# Patient Record
Sex: FEMALE | Race: OTHER | NOT HISPANIC OR LATINO
[De-identification: names, ages, dates, MRNs, and addresses within clinical notes are randomized per-mention and may not be internally consistent; named-entity substitution may affect disease eponyms.]

---

## 2018-10-20 ENCOUNTER — TRANSCRIPTION ENCOUNTER (OUTPATIENT)
Age: 48
End: 2018-10-20

## 2019-01-31 PROBLEM — Z00.00 ENCOUNTER FOR PREVENTIVE HEALTH EXAMINATION: Status: ACTIVE | Noted: 2019-01-31

## 2019-02-01 ENCOUNTER — APPOINTMENT (OUTPATIENT)
Dept: DERMATOLOGY | Facility: CLINIC | Age: 49
End: 2019-02-01
Payer: COMMERCIAL

## 2019-02-01 VITALS
BODY MASS INDEX: 34.32 KG/M2 | SYSTOLIC BLOOD PRESSURE: 120 MMHG | HEIGHT: 65 IN | DIASTOLIC BLOOD PRESSURE: 70 MMHG | WEIGHT: 206 LBS

## 2019-02-01 DIAGNOSIS — Z84.0 FAMILY HISTORY OF DISEASES OF THE SKIN AND SUBCUTANEOUS TISSUE: ICD-10-CM

## 2019-02-01 DIAGNOSIS — Z80.8 FAMILY HISTORY OF MALIGNANT NEOPLASM OF OTHER ORGANS OR SYSTEMS: ICD-10-CM

## 2019-02-01 DIAGNOSIS — Z91.89 OTHER SPECIFIED PERSONAL RISK FACTORS, NOT ELSEWHERE CLASSIFIED: ICD-10-CM

## 2019-02-01 DIAGNOSIS — Z86.69 PERSONAL HISTORY OF OTHER DISEASES OF THE NERVOUS SYSTEM AND SENSE ORGANS: ICD-10-CM

## 2019-02-01 PROCEDURE — 99203 OFFICE O/P NEW LOW 30 MIN: CPT

## 2019-02-01 NOTE — PHYSICAL EXAM
[Alert] : alert [Oriented x 3] : ~L oriented x 3 [Well Nourished] : well nourished [Conjunctiva Non-injected] : conjunctiva non-injected [No Visual Lymphadenopathy] : no visual  lymphadenopathy [No Clubbing] : no clubbing [No Edema] : no edema [No Bromhidrosis] : no bromhidrosis [No Chromhidrosis] : no chromhidrosis [FreeTextEntry3] : erythematous pink papules around mouth\par mobile subcutaneous nodule on L parietal scalp

## 2019-02-01 NOTE — HISTORY OF PRESENT ILLNESS
[FreeTextEntry1] : rash around mouth [de-identified] : 48F here for eval of:\par \par 1) Rash around mouth x 1m, getting worse. Itchy. Not previously treated.\par 2) Bump on head x many years. Asymptomatic. No treatment attempted. Not changing.\par

## 2019-02-01 NOTE — REVIEW OF SYSTEMS
[Patient Intake Form Reviewed] : Patient intake form was reviewed [Night Sweats] : night sweats [Joint Pain] : joint pain [Negative] : Genitourinary

## 2019-03-15 ENCOUNTER — APPOINTMENT (OUTPATIENT)
Dept: DERMATOLOGY | Facility: CLINIC | Age: 49
End: 2019-03-15

## 2019-08-19 ENCOUNTER — TRANSCRIPTION ENCOUNTER (OUTPATIENT)
Age: 49
End: 2019-08-19

## 2019-08-24 ENCOUNTER — TRANSCRIPTION ENCOUNTER (OUTPATIENT)
Age: 49
End: 2019-08-24

## 2019-10-12 ENCOUNTER — TRANSCRIPTION ENCOUNTER (OUTPATIENT)
Age: 49
End: 2019-10-12

## 2020-04-26 ENCOUNTER — MESSAGE (OUTPATIENT)
Age: 50
End: 2020-04-26

## 2020-05-13 ENCOUNTER — APPOINTMENT (OUTPATIENT)
Dept: DISASTER EMERGENCY | Facility: CLINIC | Age: 50
End: 2020-05-13

## 2020-05-14 LAB
SARS-COV-2 IGG SERPL IA-ACNC: 4.9 AU/ML
SARS-COV-2 IGG SERPL QL IA: NEGATIVE

## 2020-06-02 ENCOUNTER — APPOINTMENT (OUTPATIENT)
Dept: CHRONIC DISEASE MANAGEMENT | Facility: CLINIC | Age: 50
End: 2020-06-02

## 2020-06-02 VITALS — WEIGHT: 255 LBS | BODY MASS INDEX: 42.49 KG/M2 | HEIGHT: 65 IN

## 2020-06-17 ENCOUNTER — APPOINTMENT (OUTPATIENT)
Dept: CHRONIC DISEASE MANAGEMENT | Facility: CLINIC | Age: 50
End: 2020-06-17

## 2020-06-17 VITALS — WEIGHT: 255 LBS | BODY MASS INDEX: 42.43 KG/M2

## 2020-07-01 ENCOUNTER — APPOINTMENT (OUTPATIENT)
Dept: CHRONIC DISEASE MANAGEMENT | Facility: CLINIC | Age: 50
End: 2020-07-01

## 2020-07-01 VITALS — BODY MASS INDEX: 42.43 KG/M2 | WEIGHT: 255 LBS

## 2020-07-15 ENCOUNTER — APPOINTMENT (OUTPATIENT)
Dept: CHRONIC DISEASE MANAGEMENT | Facility: CLINIC | Age: 50
End: 2020-07-15

## 2020-07-15 VITALS — BODY MASS INDEX: 42.27 KG/M2 | WEIGHT: 254 LBS

## 2020-07-16 ENCOUNTER — TRANSCRIPTION ENCOUNTER (OUTPATIENT)
Age: 50
End: 2020-07-16

## 2020-08-26 ENCOUNTER — APPOINTMENT (OUTPATIENT)
Dept: ENDOCRINOLOGY | Facility: CLINIC | Age: 50
End: 2020-08-26
Payer: COMMERCIAL

## 2020-08-26 VITALS
BODY MASS INDEX: 41.99 KG/M2 | WEIGHT: 252 LBS | HEIGHT: 65 IN | SYSTOLIC BLOOD PRESSURE: 124 MMHG | TEMPERATURE: 98 F | HEART RATE: 79 BPM | DIASTOLIC BLOOD PRESSURE: 78 MMHG | OXYGEN SATURATION: 98 %

## 2020-08-26 PROCEDURE — 99205 OFFICE O/P NEW HI 60 MIN: CPT | Mod: 25

## 2020-08-26 PROCEDURE — 76536 US EXAM OF HEAD AND NECK: CPT

## 2020-08-26 NOTE — PROCEDURE
[Ubicom e 2008 model, 10-12 MHz frequencies] : multiple real time longitudinal and transverse images were obtained using a high resolution ultrasound with a linear transducer, Ubicom e 2008 model, 10-12 MHz frequencies. All measurements will be reported as longitudinal x syd-posterior x transverse. [Thyroid Nodule] : thyroid nodule [Right Thyroid] : right [] : a homogeneous parenchyma [Mixed] : mixed [Regular] : regular [No] : does not have a halo [No calcification] : no calcification [Left Thyroid] : left [Mid] : mid pole there is a  [Solid] : solid [Ovoid] : ovoid in shape [Spongiform Appearance] : spongiform appearance [Smooth] : smooth [No calcifications] : no calcifications [2] : 2 [Peripheral vascularity] : peripheral vascularity [No abnormal lymph nodes are seen.] : no abnormal lymph nodes are seen [FreeTextEntry1] : 3.93 x  1.12 x 1.64 [FreeTextEntry5] : 3.86 x 0.84 x 1.2 [FreeTextEntry2] : 0.18 [FreeTextEntry3] : 0.72 x 0.43 x 0.55

## 2020-08-26 NOTE — REVIEW OF SYSTEMS
[Fatigue] : no fatigue [Decreased Appetite] : appetite not decreased [Recent Weight Gain (___ Lbs)] : no recent weight gain [Visual Field Defect] : no visual field defect [Recent Weight Loss (___ Lbs)] : no recent weight loss [Dysphagia] : no dysphagia [Dry Eyes] : no dryness [Dysphonia] : no dysphonia [Neck Pain] : no neck pain [Nasal Congestion] : no nasal congestion [Chest Pain] : no chest pain [Slow Heart Rate] : heart rate is not slow [Palpitations] : no palpitations [Shortness Of Breath] : no shortness of breath [Fast Heart Rate] : heart rate is not fast [Nausea] : no nausea [Cough] : no cough [Constipation] : no constipation [Vomiting] : no vomiting [Diarrhea] : no diarrhea [Polyuria] : no polyuria [Irregular Menses] : regular menses [Joint Pain] : no joint pain [Muscle Weakness] : no muscle weakness [Dizziness] : no dizziness [Headaches] : no headaches [Polydipsia] : no polydipsia [Tremors] : no tremors [Cold Intolerance] : no cold intolerance [Easy Bleeding] : no ~M tendency for easy bleeding [Easy Bruising] : no tendency for easy bruising

## 2020-08-26 NOTE — PROCEDURE
[E-Duction e 2008 model, 10-12 MHz frequencies] : multiple real time longitudinal and transverse images were obtained using a high resolution ultrasound with a linear transducer, E-Duction e 2008 model, 10-12 MHz frequencies. All measurements will be reported as longitudinal x syd-posterior x transverse. [Thyroid Nodule] : thyroid nodule [Right Thyroid] : right [] : a homogeneous parenchyma [Mixed] : mixed [No] : does not have a halo [Regular] : regular [No calcification] : no calcification [Left Thyroid] : left [Solid] : solid [Mid] : mid pole there is a  [Smooth] : smooth [Ovoid] : ovoid in shape [Spongiform Appearance] : spongiform appearance [No calcifications] : no calcifications [Peripheral vascularity] : peripheral vascularity [2] : 2 [No abnormal lymph nodes are seen.] : no abnormal lymph nodes are seen [FreeTextEntry1] : 3.93 x  1.12 x 1.64 [FreeTextEntry5] : 3.86 x 0.84 x 1.2 [FreeTextEntry2] : 0.18 [FreeTextEntry3] : 0.72 x 0.43 x 0.55

## 2020-08-26 NOTE — REVIEW OF SYSTEMS
[Fatigue] : no fatigue [Decreased Appetite] : appetite not decreased [Recent Weight Gain (___ Lbs)] : no recent weight gain [Visual Field Defect] : no visual field defect [Recent Weight Loss (___ Lbs)] : no recent weight loss [Dysphagia] : no dysphagia [Dry Eyes] : no dryness [Dysphonia] : no dysphonia [Neck Pain] : no neck pain [Nasal Congestion] : no nasal congestion [Chest Pain] : no chest pain [Slow Heart Rate] : heart rate is not slow [Palpitations] : no palpitations [Shortness Of Breath] : no shortness of breath [Fast Heart Rate] : heart rate is not fast [Cough] : no cough [Nausea] : no nausea [Diarrhea] : no diarrhea [Vomiting] : no vomiting [Constipation] : no constipation [Polyuria] : no polyuria [Irregular Menses] : regular menses [Joint Pain] : no joint pain [Muscle Weakness] : no muscle weakness [Headaches] : no headaches [Dizziness] : no dizziness [Tremors] : no tremors [Polydipsia] : no polydipsia [Easy Bleeding] : no ~M tendency for easy bleeding [Cold Intolerance] : no cold intolerance [Easy Bruising] : no tendency for easy bruising

## 2020-08-26 NOTE — PHYSICAL EXAM
[Alert] : alert [Well Nourished] : well nourished [No Acute Distress] : no acute distress [EOMI] : extra ocular movement intact [Normal Sclera/Conjunctiva] : normal sclera/conjunctiva [PERRL] : pupils equal, round and reactive to light [Normal Outer Ear/Nose] : the ears and nose were normal in appearance [Normal Hearing] : hearing was normal [Normal TMs] : both tympanic membranes were normal [Thyroid Not Enlarged] : the thyroid was not enlarged [Clear to Auscultation] : lungs were clear to auscultation bilaterally [No Respiratory Distress] : no respiratory distress [Normal Rate] : heart rate was normal [Normal S1, S2] : normal S1 and S2 [Not Tender] : non-tender [Normal Bowel Sounds] : normal bowel sounds [Regular Rhythm] : with a regular rhythm [Soft] : abdomen soft [Normal Gait] : normal gait [No Clubbing, Cyanosis] : no clubbing  or cyanosis of the fingernails [No Joint Swelling] : no joint swelling seen [No Rash] : no rash [No Skin Lesions] : no skin lesions [Normal Reflexes] : deep tendon reflexes were 2+ and symmetric [No Motor Deficits] : the motor exam was normal [Oriented x3] : oriented to person, place, and time [No Tremors] : no tremors [Normal Affect] : the affect was normal [Normal Insight/Judgement] : insight and judgment were intact [Normal Mood] : the mood was normal

## 2020-08-26 NOTE — ASSESSMENT
[FreeTextEntry1] : 49 year old female here for evaluation of hypothyroidism. \par Clinically hypothyroid and found to have a TSH of 7.8 \par Also noted on thyroid ultrasound today to have a right and left sided thyroid nodule ( Right sided nodule was 1.79 cm and mixed) and left sided was spongiform nodule at 0.88 cm \par \par \par -Start Levothyroxine 75 mcg daily \par -Check TFTs in 4-6 weeks \par -Weight loss techniques were discussed \par -Will schedule for right sided nodule FNA meeting FABIO critieria for biopsy \par \par -Follow up in 4 weeks for FNA

## 2020-08-26 NOTE — HISTORY OF PRESENT ILLNESS
[FreeTextEntry1] : 49 year old female here for evaluation of hypothyroidism \par \par \par She reported that she had a general unwell feeling. \par She reported excessive fatigue and joint pains \par Reported that she has an uncomfortable sensation in the anterior neck pain \par \par \par Symptoms: \par +Constipation \par +Periods are becoming irregular from April 2020 \par No cold intolerance \par Increased hair loss \par Lost edge of the eyebrows ( couple of years) \par Reported that she has gained 50 lbs in the last year  \par \par \par 08/14/2020:\par TSH of 7.82 and Free T4; 0.79

## 2020-08-26 NOTE — PHYSICAL EXAM
[No Acute Distress] : no acute distress [Alert] : alert [Well Nourished] : well nourished [EOMI] : extra ocular movement intact [Normal Sclera/Conjunctiva] : normal sclera/conjunctiva [PERRL] : pupils equal, round and reactive to light [Normal Outer Ear/Nose] : the ears and nose were normal in appearance [Normal TMs] : both tympanic membranes were normal [Normal Hearing] : hearing was normal [Thyroid Not Enlarged] : the thyroid was not enlarged [Clear to Auscultation] : lungs were clear to auscultation bilaterally [No Respiratory Distress] : no respiratory distress [Normal Rate] : heart rate was normal [Normal S1, S2] : normal S1 and S2 [Regular Rhythm] : with a regular rhythm [Normal Bowel Sounds] : normal bowel sounds [Not Tender] : non-tender [Soft] : abdomen soft [No Clubbing, Cyanosis] : no clubbing  or cyanosis of the fingernails [Normal Gait] : normal gait [No Joint Swelling] : no joint swelling seen [No Rash] : no rash [No Motor Deficits] : the motor exam was normal [Normal Reflexes] : deep tendon reflexes were 2+ and symmetric [No Skin Lesions] : no skin lesions [Oriented x3] : oriented to person, place, and time [No Tremors] : no tremors [Normal Affect] : the affect was normal [Normal Insight/Judgement] : insight and judgment were intact [Normal Mood] : the mood was normal

## 2020-08-26 NOTE — IMPRESSION
[FreeTextEntry1] : Right sided 1.79 cm mixed nodule meets FNA criteria  [FreeTextEntry2] : Schedule for next available FNA

## 2020-09-24 ENCOUNTER — APPOINTMENT (OUTPATIENT)
Dept: ENDOCRINOLOGY | Facility: CLINIC | Age: 50
End: 2020-09-24
Payer: COMMERCIAL

## 2020-09-24 VITALS
TEMPERATURE: 97.8 F | SYSTOLIC BLOOD PRESSURE: 110 MMHG | HEIGHT: 65 IN | BODY MASS INDEX: 43.15 KG/M2 | DIASTOLIC BLOOD PRESSURE: 80 MMHG | WEIGHT: 259 LBS

## 2020-09-24 PROCEDURE — 10005 FNA BX W/US GDN 1ST LES: CPT

## 2020-09-24 NOTE — PROCEDURE
[Fine Needle Aspiration] : Fine needle aspiration ~T ~C was performed. [Area of Mass: ______] : mass identified in the [unfilled] [Risks] : risks [Patient] : the patient [Benefits] : benefits [Lidocaine Cream] : lidocaine cream [Supine] : The patient was placed in the supine position with the neck extended as tolerated. [Alcohol] : with alcohol [25 gauge 1.5 inch] : A 25 gauge 1.5 inch needle was used [Ultrasonic Guidance] : ultrasound guidance was employed [Sent to Histology] : The specimens were prepared in the usual manner and sent to histology. [Afirma] : Afirma [Tolerated Well] : the patient tolerated the procedure well [Vital Signs Stable] : the vital signs were stable [FreeTextEntry1] : Right sided thyroid nodule (Right sided nodule was 1.79 cm and mixed) s/p FNA \par \par -F/u on results and patient will be called back\par -Saved for affirma \par

## 2020-09-25 LAB
FNA, THYROID: NORMAL
T4 FREE SERPL-MCNC: 1.2 NG/DL
THYROGLOB AB SERPL-ACNC: 42.7 IU/ML
THYROPEROXIDASE AB SERPL IA-ACNC: 10.1 IU/ML
TSH SERPL-ACNC: 2.55 UIU/ML

## 2020-10-20 ENCOUNTER — APPOINTMENT (OUTPATIENT)
Dept: PLASTIC SURGERY | Facility: CLINIC | Age: 50
End: 2020-10-20
Payer: COMMERCIAL

## 2020-10-20 VITALS
SYSTOLIC BLOOD PRESSURE: 112 MMHG | WEIGHT: 259 LBS | HEIGHT: 65 IN | BODY MASS INDEX: 43.15 KG/M2 | DIASTOLIC BLOOD PRESSURE: 78 MMHG

## 2020-10-20 DIAGNOSIS — Z78.9 OTHER SPECIFIED HEALTH STATUS: ICD-10-CM

## 2020-10-20 DIAGNOSIS — Z87.891 PERSONAL HISTORY OF NICOTINE DEPENDENCE: ICD-10-CM

## 2020-10-20 PROCEDURE — 99205 OFFICE O/P NEW HI 60 MIN: CPT

## 2020-10-20 PROCEDURE — 99072 ADDL SUPL MATRL&STAF TM PHE: CPT

## 2020-10-20 NOTE — CONSULT LETTER
[Dear  ___] : Dear ~SAMMY, [Referral Letter:] : I am referring [unfilled] to you for further evaluation.  My most recent evaluation follows. [Referral Closing:] : Thank you very much for seeing this patient.  If you have any questions, please do not hesitate to contact me. [Sincerely,] : Sincerely, [FreeTextEntry3] : Jason Noe MD, FACS\par Professor and System Vice-Chair, Department of Surgery, [facility]\par Director of Oncologic Reconstruction, [facility]\par , [facility]\par \par [facility]\par [address] [place] [address]\par [place] [address]\par Phone: [phone]\par Fax: [phone]\par Email: [email or web address]\par  [FreeTextEntry2] : Gabriela Maya MD

## 2020-10-20 NOTE — PHYSICAL EXAM
[Bra Size: _______] : Bra Size: [unfilled] [de-identified] : bilateral large breast and grade 2-3 ptosis. right breast with ecchymosis from recent biopsy [de-identified] : large pannus adequate for bilateral reconstruction

## 2020-10-22 ENCOUNTER — APPOINTMENT (OUTPATIENT)
Dept: BREAST CENTER | Facility: CLINIC | Age: 50
End: 2020-10-22
Payer: COMMERCIAL

## 2020-10-22 VITALS
HEART RATE: 80 BPM | HEIGHT: 65 IN | SYSTOLIC BLOOD PRESSURE: 114 MMHG | DIASTOLIC BLOOD PRESSURE: 76 MMHG | WEIGHT: 262 LBS | BODY MASS INDEX: 43.65 KG/M2

## 2020-10-22 DIAGNOSIS — Z80.8 FAMILY HISTORY OF MALIGNANT NEOPLASM OF OTHER ORGANS OR SYSTEMS: ICD-10-CM

## 2020-10-22 DIAGNOSIS — Z80.0 FAMILY HISTORY OF MALIGNANT NEOPLASM OF DIGESTIVE ORGANS: ICD-10-CM

## 2020-10-22 PROCEDURE — 99205 OFFICE O/P NEW HI 60 MIN: CPT

## 2020-10-22 RX ORDER — METRONIDAZOLE 7.5 MG/G
0.75 CREAM TOPICAL TWICE DAILY
Qty: 1 | Refills: 2 | Status: DISCONTINUED | COMMUNITY
Start: 2019-02-01 | End: 2020-10-22

## 2020-10-22 NOTE — HISTORY OF PRESENT ILLNESS
[FreeTextEntry1] : This is a 49 year old  female who was found to have an abnormality on her routine right mammogram and ultrasound .  An ultrasound guided core biopsy shows a moderately differentiated invasive ductal carcinoma, ER +VA+Her 2 neg tumor.\par \par She has had previous needle biopsies and cyst aspirations of her breasts.  \par \par She already met with Dr. Noe to discuss reconstructive surgery as she has been considering bilateral mastectomies with  EJ reconstruction.

## 2020-10-22 NOTE — DATA REVIEWED
[FreeTextEntry1] : Bilateral mammogram 10/7/2020:  Right UOQ anterior distortion.\par \par Bilateral breast ultrasound 10/7/2020:  Right 11:00 N1 irregular hypoechoic nodular tissue with increased vascularity.  Bilateral stable hypoechoic nodules.\par \par Right breast US guided core biopsy 10/14/2020 11:00- venus clip\par Pathology= mod differentiated invasive ductal carcinoma SBR 6/9 1.1 cm, ki 67 15-20%  ER >90% AK>90% Her 2 neg\par \par (Image reviewed and will be sent to [facility])

## 2020-10-22 NOTE — PAST MEDICAL HISTORY
[Total Preg ___] : G[unfilled] [Live Births ___] : P[unfilled]  [Menarche Age ____] : age at menarche was [unfilled] [Age At Live Birth ___] : Age at live birth: [unfilled] [FreeTextEntry2] : son [FreeTextEntry7] : no [FreeTextEntry8] : x 13 months

## 2020-10-22 NOTE — PHYSICAL EXAM
[EOMI] : extra ocular movement intact [Sclera nonicteric] : sclera nonicteric [Supple] : supple [No Supraclavicular Adenopathy] : no supraclavicular adenopathy [No Cervical Adenopathy] : no cervical adenopathy [No Thyromegaly] : no thyromegaly [Clear to Auscultation Bilat] : clear to auscultation bilaterally [Normal Sinus Rhythm] : normal sinus rhythm [Normal S1, S2] : normal S1 and S2 [Examined in the supine and seated position] : examined in the supine and seated position [Bra Size: ___] : Bra Size: [unfilled] [Grade 3] : Ptosis Grade 3 [No dominant masses] : no dominant masses in right breast  [No dominant masses] : no dominant masses left breast [No Nipple Retraction] : no left nipple retraction [No Nipple Discharge] : no left nipple discharge [No Axillary Lymphadenopathy] : no left axillary lymphadenopathy [Soft] : abdomen soft [Not Tender] : non-tender [No Palpable Masses] : no abdominal mass palpated [de-identified] : Ecchymoses periareola 1:00.

## 2020-10-23 NOTE — PHYSICAL EXAM
[de-identified] :  Bra Size: 44DD, bilateral large breast and grade 2-3 ptosis. right breast with ecchymosis from recent biopsy  [de-identified] : Abdomen: large pannus adequate for bilateral reconstruction

## 2020-10-23 NOTE — HISTORY OF PRESENT ILLNESS
[FreeTextEntry1] : 49 y.o. F history of hypothryoidism who was recently diagnosed with right breast invasive CA who presents seeking breast reconstruction consultation. Pt has hsitory of dense breast tissue and has undergone biopsies in the past, she was found to have an abnormality upon routine mammography. Pt unsure whether she will undergone bilateral mastectomies versus partial mastectomy; she does voice often thought of undergoing bilateral mastectomies to avoid future mammograms and biopsies given her history of dense breast tissue.

## 2020-10-26 ENCOUNTER — NON-APPOINTMENT (OUTPATIENT)
Age: 50
End: 2020-10-26

## 2020-10-26 ENCOUNTER — RESULT REVIEW (OUTPATIENT)
Age: 50
End: 2020-10-26

## 2020-10-26 ENCOUNTER — APPOINTMENT (OUTPATIENT)
Dept: MRI IMAGING | Facility: CLINIC | Age: 50
End: 2020-10-26
Payer: COMMERCIAL

## 2020-10-26 ENCOUNTER — OUTPATIENT (OUTPATIENT)
Dept: OUTPATIENT SERVICES | Facility: HOSPITAL | Age: 50
LOS: 1 days | End: 2020-10-26
Payer: COMMERCIAL

## 2020-10-26 DIAGNOSIS — C50.411 MALIGNANT NEOPLASM OF UPPER-OUTER QUADRANT OF RIGHT FEMALE BREAST: ICD-10-CM

## 2020-10-26 PROCEDURE — A9585: CPT

## 2020-10-26 PROCEDURE — C8908: CPT

## 2020-10-26 PROCEDURE — 77049 MRI BREAST C-+ W/CAD BI: CPT | Mod: 26

## 2020-10-26 PROCEDURE — C8937: CPT

## 2020-10-29 ENCOUNTER — OUTPATIENT (OUTPATIENT)
Dept: OUTPATIENT SERVICES | Facility: HOSPITAL | Age: 50
LOS: 1 days | End: 2020-10-29
Payer: COMMERCIAL

## 2020-10-29 ENCOUNTER — RESULT REVIEW (OUTPATIENT)
Age: 50
End: 2020-10-29

## 2020-10-29 DIAGNOSIS — C50.411 MALIGNANT NEOPLASM OF UPPER-OUTER QUADRANT OF RIGHT FEMALE BREAST: ICD-10-CM

## 2020-10-29 PROCEDURE — 88321 CONSLTJ&REPRT SLD PREP ELSWR: CPT

## 2020-10-30 ENCOUNTER — OUTPATIENT (OUTPATIENT)
Dept: OUTPATIENT SERVICES | Facility: HOSPITAL | Age: 50
LOS: 1 days | End: 2020-10-30
Payer: COMMERCIAL

## 2020-10-30 ENCOUNTER — APPOINTMENT (OUTPATIENT)
Dept: MRI IMAGING | Facility: CLINIC | Age: 50
End: 2020-10-30
Payer: COMMERCIAL

## 2020-10-30 DIAGNOSIS — Z00.8 ENCOUNTER FOR OTHER GENERAL EXAMINATION: ICD-10-CM

## 2020-10-30 DIAGNOSIS — C50.411 MALIGNANT NEOPLASM OF UPPER-OUTER QUADRANT OF RIGHT FEMALE BREAST: ICD-10-CM

## 2020-10-30 DIAGNOSIS — C50.911 MALIGNANT NEOPLASM OF UNSPECIFIED SITE OF RIGHT FEMALE BREAST: ICD-10-CM

## 2020-10-30 PROCEDURE — A9585: CPT

## 2020-10-30 PROCEDURE — C8902: CPT

## 2020-10-30 PROCEDURE — 74185 MRA ABD W OR W/O CNTRST: CPT | Mod: 26

## 2020-10-30 PROCEDURE — 72198 MR ANGIO PELVIS W/O & W/DYE: CPT | Mod: 26

## 2020-10-30 PROCEDURE — C8920: CPT

## 2020-11-03 ENCOUNTER — APPOINTMENT (OUTPATIENT)
Dept: MRI IMAGING | Facility: CLINIC | Age: 50
End: 2020-11-03

## 2020-11-03 DIAGNOSIS — Z90.49 ACQUIRED ABSENCE OF OTHER SPECIFIED PARTS OF DIGESTIVE TRACT: ICD-10-CM

## 2020-11-03 DIAGNOSIS — C50.919 MALIGNANT NEOPLASM OF UNSPECIFIED SITE OF UNSPECIFIED FEMALE BREAST: ICD-10-CM

## 2020-11-03 DIAGNOSIS — Z01.818 ENCOUNTER FOR OTHER PREPROCEDURAL EXAMINATION: ICD-10-CM

## 2020-11-05 ENCOUNTER — APPOINTMENT (OUTPATIENT)
Dept: MRI IMAGING | Facility: CLINIC | Age: 50
End: 2020-11-05

## 2020-11-06 ENCOUNTER — TRANSCRIPTION ENCOUNTER (OUTPATIENT)
Age: 50
End: 2020-11-06

## 2020-11-12 ENCOUNTER — APPOINTMENT (OUTPATIENT)
Dept: PLASTIC SURGERY | Facility: CLINIC | Age: 50
End: 2020-11-12
Payer: COMMERCIAL

## 2020-11-12 VITALS
OXYGEN SATURATION: 98 % | HEIGHT: 65 IN | SYSTOLIC BLOOD PRESSURE: 123 MMHG | BODY MASS INDEX: 43.65 KG/M2 | HEART RATE: 78 BPM | TEMPERATURE: 98 F | DIASTOLIC BLOOD PRESSURE: 80 MMHG | WEIGHT: 262 LBS

## 2020-11-12 PROCEDURE — 99214 OFFICE O/P EST MOD 30 MIN: CPT

## 2020-11-12 PROCEDURE — 99072 ADDL SUPL MATRL&STAF TM PHE: CPT

## 2020-11-12 NOTE — HISTORY OF PRESENT ILLNESS
[FreeTextEntry1] : 49 y.o. F history of hypothryoidism history of right breast invasive CA has decided to proceed with bilateral mastectomies with EJ flap reconstruction and presents for a pre operative visit. Pt reports her sister has been diagnosed with breast cancer as well in the past week. No other change in H&P. \par \par Imaging reviewed (abd/pelvis MRA).

## 2020-11-17 ENCOUNTER — NON-APPOINTMENT (OUTPATIENT)
Age: 50
End: 2020-11-17

## 2020-11-24 ENCOUNTER — RESULT REVIEW (OUTPATIENT)
Age: 50
End: 2020-11-24

## 2020-11-24 ENCOUNTER — OUTPATIENT (OUTPATIENT)
Dept: OUTPATIENT SERVICES | Facility: HOSPITAL | Age: 50
LOS: 1 days | End: 2020-11-24
Payer: COMMERCIAL

## 2020-11-24 DIAGNOSIS — Z83.79 FAMILY HISTORY OF OTHER DISEASES OF THE DIGESTIVE SYSTEM: Chronic | ICD-10-CM

## 2020-11-24 DIAGNOSIS — Z01.818 ENCOUNTER FOR OTHER PREPROCEDURAL EXAMINATION: ICD-10-CM

## 2020-11-24 DIAGNOSIS — C50.411 MALIGNANT NEOPLASM OF UPPER-OUTER QUADRANT OF RIGHT FEMALE BREAST: ICD-10-CM

## 2020-11-24 LAB
ALBUMIN SERPL ELPH-MCNC: 3.5 G/DL — SIGNIFICANT CHANGE UP (ref 3.3–5)
ALP SERPL-CCNC: 97 U/L — SIGNIFICANT CHANGE UP (ref 40–120)
ALT FLD-CCNC: 15 U/L — SIGNIFICANT CHANGE UP (ref 12–78)
ANION GAP SERPL CALC-SCNC: 5 MMOL/L — SIGNIFICANT CHANGE UP (ref 5–17)
APPEARANCE UR: CLEAR — SIGNIFICANT CHANGE UP
APTT BLD: 36.3 SEC — HIGH (ref 27.5–35.5)
AST SERPL-CCNC: 15 U/L — SIGNIFICANT CHANGE UP (ref 15–37)
BASOPHILS # BLD AUTO: 0.08 K/UL — SIGNIFICANT CHANGE UP (ref 0–0.2)
BASOPHILS NFR BLD AUTO: 1.2 % — SIGNIFICANT CHANGE UP (ref 0–2)
BILIRUB SERPL-MCNC: 0.6 MG/DL — SIGNIFICANT CHANGE UP (ref 0.2–1.2)
BILIRUB UR-MCNC: NEGATIVE — SIGNIFICANT CHANGE UP
BUN SERPL-MCNC: 10 MG/DL — SIGNIFICANT CHANGE UP (ref 7–23)
CALCIUM SERPL-MCNC: 9 MG/DL — SIGNIFICANT CHANGE UP (ref 8.5–10.1)
CHLORIDE SERPL-SCNC: 110 MMOL/L — HIGH (ref 96–108)
CO2 SERPL-SCNC: 25 MMOL/L — SIGNIFICANT CHANGE UP (ref 22–31)
COLOR SPEC: YELLOW — SIGNIFICANT CHANGE UP
CREAT SERPL-MCNC: 0.81 MG/DL — SIGNIFICANT CHANGE UP (ref 0.5–1.3)
DIFF PNL FLD: ABNORMAL
EOSINOPHIL # BLD AUTO: 0.19 K/UL — SIGNIFICANT CHANGE UP (ref 0–0.5)
EOSINOPHIL NFR BLD AUTO: 2.9 % — SIGNIFICANT CHANGE UP (ref 0–6)
GLUCOSE SERPL-MCNC: 88 MG/DL — SIGNIFICANT CHANGE UP (ref 70–99)
GLUCOSE UR QL: NEGATIVE MG/DL — SIGNIFICANT CHANGE UP
HCT VFR BLD CALC: 45.3 % — HIGH (ref 34.5–45)
HGB BLD-MCNC: 14.7 G/DL — SIGNIFICANT CHANGE UP (ref 11.5–15.5)
IMM GRANULOCYTES NFR BLD AUTO: 0.2 % — SIGNIFICANT CHANGE UP (ref 0–1.5)
INR BLD: 1.07 RATIO — SIGNIFICANT CHANGE UP (ref 0.88–1.16)
KETONES UR-MCNC: NEGATIVE — SIGNIFICANT CHANGE UP
LEUKOCYTE ESTERASE UR-ACNC: NEGATIVE — SIGNIFICANT CHANGE UP
LYMPHOCYTES # BLD AUTO: 1.97 K/UL — SIGNIFICANT CHANGE UP (ref 1–3.3)
LYMPHOCYTES # BLD AUTO: 29.7 % — SIGNIFICANT CHANGE UP (ref 13–44)
MCHC RBC-ENTMCNC: 28.4 PG — SIGNIFICANT CHANGE UP (ref 27–34)
MCHC RBC-ENTMCNC: 32.5 GM/DL — SIGNIFICANT CHANGE UP (ref 32–36)
MCV RBC AUTO: 87.6 FL — SIGNIFICANT CHANGE UP (ref 80–100)
MONOCYTES # BLD AUTO: 0.35 K/UL — SIGNIFICANT CHANGE UP (ref 0–0.9)
MONOCYTES NFR BLD AUTO: 5.3 % — SIGNIFICANT CHANGE UP (ref 2–14)
NEUTROPHILS # BLD AUTO: 4.03 K/UL — SIGNIFICANT CHANGE UP (ref 1.8–7.4)
NEUTROPHILS NFR BLD AUTO: 60.7 % — SIGNIFICANT CHANGE UP (ref 43–77)
NITRITE UR-MCNC: NEGATIVE — SIGNIFICANT CHANGE UP
PH UR: 5 — SIGNIFICANT CHANGE UP (ref 5–8)
PLATELET # BLD AUTO: 275 K/UL — SIGNIFICANT CHANGE UP (ref 150–400)
POTASSIUM SERPL-MCNC: 3.7 MMOL/L — SIGNIFICANT CHANGE UP (ref 3.5–5.3)
POTASSIUM SERPL-SCNC: 3.7 MMOL/L — SIGNIFICANT CHANGE UP (ref 3.5–5.3)
PROT SERPL-MCNC: 7.6 GM/DL — SIGNIFICANT CHANGE UP (ref 6–8.3)
PROT UR-MCNC: NEGATIVE MG/DL — SIGNIFICANT CHANGE UP
PROTHROM AB SERPL-ACNC: 12.5 SEC — SIGNIFICANT CHANGE UP (ref 10.6–13.6)
RBC # BLD: 5.17 M/UL — SIGNIFICANT CHANGE UP (ref 3.8–5.2)
RBC # FLD: 11.9 % — SIGNIFICANT CHANGE UP (ref 10.3–14.5)
SODIUM SERPL-SCNC: 140 MMOL/L — SIGNIFICANT CHANGE UP (ref 135–145)
SP GR SPEC: 1.02 — SIGNIFICANT CHANGE UP (ref 1.01–1.02)
UROBILINOGEN FLD QL: NEGATIVE MG/DL — SIGNIFICANT CHANGE UP
WBC # BLD: 6.63 K/UL — SIGNIFICANT CHANGE UP (ref 3.8–10.5)
WBC # FLD AUTO: 6.63 K/UL — SIGNIFICANT CHANGE UP (ref 3.8–10.5)

## 2020-11-24 PROCEDURE — 85025 COMPLETE CBC W/AUTO DIFF WBC: CPT

## 2020-11-24 PROCEDURE — 81001 URINALYSIS AUTO W/SCOPE: CPT

## 2020-11-24 PROCEDURE — 36415 COLL VENOUS BLD VENIPUNCTURE: CPT

## 2020-11-24 PROCEDURE — 71046 X-RAY EXAM CHEST 2 VIEWS: CPT

## 2020-11-24 PROCEDURE — 93005 ELECTROCARDIOGRAM TRACING: CPT

## 2020-11-24 PROCEDURE — 86850 RBC ANTIBODY SCREEN: CPT

## 2020-11-24 PROCEDURE — 85610 PROTHROMBIN TIME: CPT

## 2020-11-24 PROCEDURE — 71046 X-RAY EXAM CHEST 2 VIEWS: CPT | Mod: 26

## 2020-11-24 PROCEDURE — 80053 COMPREHEN METABOLIC PANEL: CPT

## 2020-11-24 PROCEDURE — 86900 BLOOD TYPING SEROLOGIC ABO: CPT

## 2020-11-24 PROCEDURE — 93010 ELECTROCARDIOGRAM REPORT: CPT

## 2020-11-24 PROCEDURE — 85730 THROMBOPLASTIN TIME PARTIAL: CPT

## 2020-11-24 PROCEDURE — 86901 BLOOD TYPING SEROLOGIC RH(D): CPT

## 2020-11-24 NOTE — PATIENT PROFILE ADULT - FUNCTIONAL SCREEN CURRENT LEVEL: COMMUNICATION, MLM
PHYSICAL THERAPY KNEE TREATMENT NOTE - INPATIENT     Room Number: 432/432-A             Presenting Problem: L TKA    Problem List  Principal Problem:    Primary osteoarthritis of left knee  Active Problems:    Essential hypertension    Hyperlipidemia    Hy with home health PT    PLAN  PT Treatment Plan: Bed mobility; Patient education;Gait training;Range of motion;Strengthening;Transfer training;Balance training    SUBJECTIVE  I have a pain in L knee.     OBJECTIVE  Precautions: Limb alert - left    WEIGHT CIELO Standing heel/toe raises 0 reps 0 reps   Standing knee flexion 0 reps 0 reps   Extension stretch  1x 1x         Knee ROM      L Knee Flexion (degrees): 80     L Knee Extension (degrees): 0    Patient End of Session: Up in chair;Needs met;Call light withi 0 = understands/communicates without difficulty

## 2020-11-24 NOTE — CHART NOTE - NSCHARTNOTEFT_GEN_A_CORE
49 year old woman recently diagnosed with breast cancer presents to [facility] for preprocedure exam. Patient is for planned bilateral mastectomy with sentinel node biopsy possible right axillary dissection and EJ flap reconstruction with Dr Palma Ramires and Dr Noe.     Vital Signs Last 24 Hrs  T(F): --98.3  HR: --68  BP: -- 127/72  RR: --18  SpO2: --98        Plan:  - PST instructions given ; NPO status instructions to be given by ASU .  - Pt instructed to take following meds with sip of water : levothyroxine  - Pt instructed to take routine evening medications unless indicated .  -  Stop NSAIDS ( Aspirin Alev Motrin Mobic Diclofenac), herbal supplements , MVI , Vitamin fish oil 7 days prior to surgery  unless   directed by surgeon or cardiologist;   - Medical Optimization  with Dr Thakur  - EZ wash instructions given   - Labs EKG CXR as per surgeon request.   -  Pt instructed to self quarantine .  - Covid Testing scheduled on ____11/27_____.  Pt notified and aware.  - Pt denies covid symptoms shortness of breath fever cough .

## 2020-11-25 DIAGNOSIS — Z01.818 ENCOUNTER FOR OTHER PREPROCEDURAL EXAMINATION: ICD-10-CM

## 2020-11-25 DIAGNOSIS — C50.411 MALIGNANT NEOPLASM OF UPPER-OUTER QUADRANT OF RIGHT FEMALE BREAST: ICD-10-CM

## 2020-11-25 PROBLEM — E03.9 HYPOTHYROIDISM, UNSPECIFIED: Chronic | Status: ACTIVE | Noted: 2020-11-24

## 2020-11-27 ENCOUNTER — OUTPATIENT (OUTPATIENT)
Dept: OUTPATIENT SERVICES | Facility: HOSPITAL | Age: 50
LOS: 1 days | End: 2020-11-27
Payer: COMMERCIAL

## 2020-11-27 DIAGNOSIS — Z11.59 ENCOUNTER FOR SCREENING FOR OTHER VIRAL DISEASES: ICD-10-CM

## 2020-11-27 DIAGNOSIS — Z83.79 FAMILY HISTORY OF OTHER DISEASES OF THE DIGESTIVE SYSTEM: Chronic | ICD-10-CM

## 2020-11-27 LAB — SARS-COV-2 RNA SPEC QL NAA+PROBE: SIGNIFICANT CHANGE UP

## 2020-11-27 PROCEDURE — U0003: CPT

## 2020-11-28 DIAGNOSIS — Z11.59 ENCOUNTER FOR SCREENING FOR OTHER VIRAL DISEASES: ICD-10-CM

## 2020-11-30 ENCOUNTER — APPOINTMENT (OUTPATIENT)
Dept: PLASTIC SURGERY | Facility: HOSPITAL | Age: 50
End: 2020-11-30
Payer: COMMERCIAL

## 2020-11-30 ENCOUNTER — RESULT REVIEW (OUTPATIENT)
Age: 50
End: 2020-11-30

## 2020-11-30 ENCOUNTER — INPATIENT (INPATIENT)
Facility: HOSPITAL | Age: 50
LOS: 1 days | Discharge: HOME | DRG: 580 | End: 2020-12-02
Attending: SURGERY | Admitting: SURGERY
Payer: COMMERCIAL

## 2020-11-30 ENCOUNTER — APPOINTMENT (OUTPATIENT)
Dept: BREAST CENTER | Facility: HOSPITAL | Age: 50
End: 2020-11-30

## 2020-11-30 VITALS
WEIGHT: 257.94 LBS | RESPIRATION RATE: 18 BRPM | SYSTOLIC BLOOD PRESSURE: 136 MMHG | TEMPERATURE: 98 F | OXYGEN SATURATION: 99 % | HEIGHT: 65 IN | HEART RATE: 68 BPM | DIASTOLIC BLOOD PRESSURE: 76 MMHG

## 2020-11-30 DIAGNOSIS — Z83.79 FAMILY HISTORY OF OTHER DISEASES OF THE DIGESTIVE SYSTEM: Chronic | ICD-10-CM

## 2020-11-30 DIAGNOSIS — C50.411 MALIGNANT NEOPLASM OF UPPER-OUTER QUADRANT OF RIGHT FEMALE BREAST: ICD-10-CM

## 2020-11-30 LAB — HCG UR QL: NEGATIVE — SIGNIFICANT CHANGE UP

## 2020-11-30 PROCEDURE — 88333 PATH CONSLTJ SURG CYTO XM 1: CPT | Mod: 26

## 2020-11-30 PROCEDURE — 88305 TISSUE EXAM BY PATHOLOGIST: CPT | Mod: 26

## 2020-11-30 PROCEDURE — 38530 BIOPSY/REMOVAL LYMPH NODES: CPT | Mod: LT

## 2020-11-30 PROCEDURE — 85014 HEMATOCRIT: CPT

## 2020-11-30 PROCEDURE — 88305 TISSUE EXAM BY PATHOLOGIST: CPT

## 2020-11-30 PROCEDURE — C1781: CPT

## 2020-11-30 PROCEDURE — 36415 COLL VENOUS BLD VENIPUNCTURE: CPT

## 2020-11-30 PROCEDURE — 88307 TISSUE EXAM BY PATHOLOGIST: CPT | Mod: 26

## 2020-11-30 PROCEDURE — 49568: CPT | Mod: LT

## 2020-11-30 PROCEDURE — S2068: CPT | Mod: RT

## 2020-11-30 PROCEDURE — 38530 BIOPSY/REMOVAL LYMPH NODES: CPT | Mod: RT

## 2020-11-30 PROCEDURE — 85027 COMPLETE CBC AUTOMATED: CPT

## 2020-11-30 PROCEDURE — C9290: CPT

## 2020-11-30 PROCEDURE — 88333 PATH CONSLTJ SURG CYTO XM 1: CPT

## 2020-11-30 PROCEDURE — 85018 HEMOGLOBIN: CPT

## 2020-11-30 PROCEDURE — 38792 RA TRACER ID OF SENTINL NODE: CPT | Mod: 59

## 2020-11-30 PROCEDURE — S2068: CPT | Mod: LT

## 2020-11-30 PROCEDURE — 88329 PATH CONSLTJ DRG SURG: CPT | Mod: 59

## 2020-11-30 PROCEDURE — 80048 BASIC METABOLIC PNL TOTAL CA: CPT

## 2020-11-30 PROCEDURE — 81025 URINE PREGNANCY TEST: CPT

## 2020-11-30 PROCEDURE — 19303 MAST SIMPLE COMPLETE: CPT | Mod: 50

## 2020-11-30 PROCEDURE — C1889: CPT

## 2020-11-30 PROCEDURE — 88307 TISSUE EXAM BY PATHOLOGIST: CPT

## 2020-11-30 PROCEDURE — A9520: CPT

## 2020-11-30 PROCEDURE — 88329 PATH CONSLTJ DRG SURG: CPT

## 2020-11-30 PROCEDURE — 38525 BIOPSY/REMOVAL LYMPH NODES: CPT | Mod: 50

## 2020-11-30 PROCEDURE — 49560: CPT | Mod: LT

## 2020-11-30 PROCEDURE — 19303 MAST SIMPLE COMPLETE: CPT | Mod: AS,50

## 2020-11-30 PROCEDURE — 38900 IO MAP OF SENT LYMPH NODE: CPT

## 2020-11-30 RX ORDER — ONDANSETRON 8 MG/1
4 TABLET, FILM COATED ORAL EVERY 6 HOURS
Refills: 0 | Status: DISCONTINUED | OUTPATIENT
Start: 2020-11-30 | End: 2020-12-02

## 2020-11-30 RX ORDER — SODIUM CHLORIDE 9 MG/ML
1000 INJECTION, SOLUTION INTRAVENOUS
Refills: 0 | Status: DISCONTINUED | OUTPATIENT
Start: 2020-11-30 | End: 2020-12-01

## 2020-11-30 RX ORDER — ENOXAPARIN SODIUM 100 MG/ML
40 INJECTION SUBCUTANEOUS ONCE
Refills: 0 | Status: DISCONTINUED | OUTPATIENT
Start: 2020-11-30 | End: 2020-11-30

## 2020-11-30 RX ORDER — KETOROLAC TROMETHAMINE 30 MG/ML
15 SYRINGE (ML) INJECTION EVERY 6 HOURS
Refills: 0 | Status: DISCONTINUED | OUTPATIENT
Start: 2020-11-30 | End: 2020-12-02

## 2020-11-30 RX ORDER — SENNA PLUS 8.6 MG/1
2 TABLET ORAL AT BEDTIME
Refills: 0 | Status: DISCONTINUED | OUTPATIENT
Start: 2020-11-30 | End: 2020-12-02

## 2020-11-30 RX ORDER — ONDANSETRON 8 MG/1
4 TABLET, FILM COATED ORAL ONCE
Refills: 0 | Status: COMPLETED | OUTPATIENT
Start: 2020-11-30 | End: 2020-11-30

## 2020-11-30 RX ORDER — HYDROMORPHONE HYDROCHLORIDE 2 MG/ML
0.5 INJECTION INTRAMUSCULAR; INTRAVENOUS; SUBCUTANEOUS EVERY 4 HOURS
Refills: 0 | Status: DISCONTINUED | OUTPATIENT
Start: 2020-11-30 | End: 2020-12-02

## 2020-11-30 RX ORDER — OXYCODONE HYDROCHLORIDE 5 MG/1
10 TABLET ORAL ONCE
Refills: 0 | Status: DISCONTINUED | OUTPATIENT
Start: 2020-11-30 | End: 2020-12-01

## 2020-11-30 RX ORDER — ACETAMINOPHEN 500 MG
1000 TABLET ORAL EVERY 6 HOURS
Refills: 0 | Status: COMPLETED | OUTPATIENT
Start: 2020-11-30 | End: 2020-12-01

## 2020-11-30 RX ORDER — ENOXAPARIN SODIUM 100 MG/ML
40 INJECTION SUBCUTANEOUS EVERY 12 HOURS
Refills: 0 | Status: DISCONTINUED | OUTPATIENT
Start: 2020-12-01 | End: 2020-12-02

## 2020-11-30 RX ORDER — LEVOTHYROXINE SODIUM 125 MCG
75 TABLET ORAL DAILY
Refills: 0 | Status: DISCONTINUED | OUTPATIENT
Start: 2020-11-30 | End: 2020-12-02

## 2020-11-30 RX ORDER — ENOXAPARIN SODIUM 100 MG/ML
40 INJECTION SUBCUTANEOUS ONCE
Refills: 0 | Status: COMPLETED | OUTPATIENT
Start: 2020-11-30 | End: 2020-11-30

## 2020-11-30 RX ORDER — CEFAZOLIN SODIUM 1 G
2000 VIAL (EA) INJECTION EVERY 8 HOURS
Refills: 0 | Status: COMPLETED | OUTPATIENT
Start: 2020-11-30 | End: 2020-12-01

## 2020-11-30 RX ORDER — OXYCODONE HYDROCHLORIDE 5 MG/1
5 TABLET ORAL EVERY 4 HOURS
Refills: 0 | Status: DISCONTINUED | OUTPATIENT
Start: 2020-11-30 | End: 2020-12-02

## 2020-11-30 RX ORDER — FENTANYL CITRATE 50 UG/ML
50 INJECTION INTRAVENOUS
Refills: 0 | Status: DISCONTINUED | OUTPATIENT
Start: 2020-11-30 | End: 2020-12-01

## 2020-11-30 RX ORDER — FAMOTIDINE 10 MG/ML
20 INJECTION INTRAVENOUS
Refills: 0 | Status: DISCONTINUED | OUTPATIENT
Start: 2020-11-30 | End: 2020-12-02

## 2020-11-30 RX ADMIN — Medication 1000 MILLIGRAM(S): at 22:30

## 2020-11-30 RX ADMIN — FENTANYL CITRATE 50 MICROGRAM(S): 50 INJECTION INTRAVENOUS at 18:50

## 2020-11-30 RX ADMIN — Medication 100 MILLIGRAM(S): at 23:18

## 2020-11-30 RX ADMIN — FAMOTIDINE 20 MILLIGRAM(S): 10 INJECTION INTRAVENOUS at 21:53

## 2020-11-30 RX ADMIN — SODIUM CHLORIDE 75 MILLILITER(S): 9 INJECTION, SOLUTION INTRAVENOUS at 18:27

## 2020-11-30 RX ADMIN — ENOXAPARIN SODIUM 40 MILLIGRAM(S): 100 INJECTION SUBCUTANEOUS at 19:39

## 2020-11-30 RX ADMIN — FENTANYL CITRATE 50 MICROGRAM(S): 50 INJECTION INTRAVENOUS at 19:15

## 2020-11-30 RX ADMIN — FENTANYL CITRATE 50 MICROGRAM(S): 50 INJECTION INTRAVENOUS at 19:00

## 2020-11-30 RX ADMIN — ONDANSETRON 4 MILLIGRAM(S): 8 TABLET, FILM COATED ORAL at 23:51

## 2020-11-30 RX ADMIN — Medication 400 MILLIGRAM(S): at 21:53

## 2020-11-30 RX ADMIN — FENTANYL CITRATE 50 MICROGRAM(S): 50 INJECTION INTRAVENOUS at 18:34

## 2020-11-30 RX ADMIN — Medication 15 MILLIGRAM(S): at 23:17

## 2020-11-30 NOTE — ASU PREOP CHECKLIST - HAIR REMOVAL
hair removal not indicated Detail Level: Simple Duration Of Freeze Thaw-Cycle (Seconds): 0 Post-Care Instructions: I reviewed with the patient in detail post-care instructions. Patient is to wear sunprotection, and avoid picking at any of the treated lesions. Pt may apply Vaseline to crusted or scabbing areas. Render Post-Care Instructions In Note?: no Total Number Of Aks Treated: 5 Consent: The patient's consent was obtained including but not limited to risks of crusting, scabbing, blistering, scarring, darker or lighter pigmentary change, recurrence, incomplete removal and infection.

## 2020-11-30 NOTE — BRIEF OPERATIVE NOTE - NSICDXBRIEFPROCEDURE_GEN_ALL_CORE_FT
PROCEDURES:  Breast reconstruction with EJ free flap 30-Nov-2020 12:45:29  Marilou Anders  
PROCEDURES:  Biopsy or excision, lymph node, sentinel, axillary, deep 30-Nov-2020 15:29:37  Rory Carroll  Mastectomy, bilateral, skin sparing 30-Nov-2020 15:27:25  Rory Carroll

## 2020-11-30 NOTE — BRIEF OPERATIVE NOTE - SPECIMENS
right and left internal lymph node biopsy
left breast , left sentinel node x1, right breast, right senting nodex2, right non-sentinel node x2, right mastectomy anterior margin

## 2020-11-30 NOTE — BRIEF OPERATIVE NOTE - NSICDXBRIEFPOSTOP_GEN_ALL_CORE_FT
POST-OP DIAGNOSIS:  Breast cancer, right 30-Nov-2020 12:46:33  Marilou Anders  
POST-OP DIAGNOSIS:  Invasive ductal carcinoma of right breast 30-Nov-2020 15:30:44  Rory Carroll

## 2020-11-30 NOTE — BRIEF OPERATIVE NOTE - NSICDXBRIEFPREOP_GEN_ALL_CORE_FT
PRE-OP DIAGNOSIS:  Breast cancer, right 30-Nov-2020 12:46:24  Marilou Anders  
PRE-OP DIAGNOSIS:  Invasive ductal carcinoma of right breast 30-Nov-2020 15:30:27  Rory Carroll

## 2020-11-30 NOTE — BRIEF OPERATIVE NOTE - OPERATION/FINDINGS
Immediate bilateral deep inferior epigastric  free flap transabdominus plane blocks with injection of liposomal bupivacaine, bilateral internal mammary lymph node biopsies
left breast 1248 grams  right breast 1283 grams

## 2020-11-30 NOTE — PROGRESS NOTE ADULT - ASSESSMENT
Ass:  S/P Mastectomy, bilateral, skin sparing, Breast reconstruction with EJ free flap   Plan:  Pt may be transferred to the floor, Continue present management.

## 2020-11-30 NOTE — PROGRESS NOTE ADULT - SUBJECTIVE AND OBJECTIVE BOX
Post-op check:  S/P Mastectomy, bilateral, skin sparing, Breast reconstruction with EJ free flap     Pt is awake and alert with complaint of some numbness on the very tips of her fingers bilateral.  The pt states that she had complaints of pain in bilateral elbow creases when she woke from anesthesia but that pain has resolved.     T(C): 36.3 (11-30-20 @ 18:02), Max: 36.7 (11-30-20 @ 06:58)  HR: 77 (11-30-20 @ 20:00) (68 - 77)  BP: 122/57 (11-30-20 @ 20:00) (100/73 - 136/76)  RR: 14 (11-30-20 @ 20:00) (10 - 19)  SpO2: 100% (11-30-20 @ 20:00) (99% - 100%)  Wt(kg): --    Skin: warm and dry   Bilateral breasts:  symmetrical, incisions clean and dry, no ecchymosis, no hematoma,               Vioptics: Right:  51%StO2  94 signal quality   MARY: 59cc                            Left:  61%StO2  96 Quality              MARY: 40cc    Abd:  incision clean and dry, no ecchymosis, or hematoma, MARY right: 46cc  Left:  43cc    Ext:  Bilateral UE:  full range of motion, no pain, motor and sensory intact bilat, good capillary refill bilaterally          Bilateral LE: no c/c/e, venodynes in place    Neuro:  A&O x 3, CNII-XII grossly intact         Post-op check:  S/P Mastectomy, bilateral, skin sparing, Breast reconstruction with EJ free flap     Pt is awake and alert with complaint of some numbness on the very tips of her fingers bilateral.  The pt states that she had complaints of pain in bilateral elbow creases when she woke from anesthesia but that pain has resolved.     T(C): 36.3 (11-30-20 @ 18:02), Max: 36.7 (11-30-20 @ 06:58)  HR: 77 (11-30-20 @ 20:00) (68 - 77)  BP: 122/57 (11-30-20 @ 20:00) (100/73 - 136/76)  RR: 14 (11-30-20 @ 20:00) (10 - 19)  SpO2: 100% (11-30-20 @ 20:00) (99% - 100%)  Wt(kg): --    UO=90cc    Skin: warm and dry   Bilateral breasts:  symmetrical, incisions clean and dry, no ecchymosis, no hematoma,               Vioptics: Right:  51%StO2  94 signal quality   MARY: 59cc                            Left:  61%StO2  96 Quality              MARY: 40cc    Abd:  incision clean and dry, no ecchymosis, or hematoma, MARY right: 46cc  Left:  43cc    Ext:  Bilateral UE:  full range of motion, no pain, motor and sensory intact bilat, good capillary refill bilaterally          Bilateral LE: no c/c/e, venodynes in place    Neuro:  A&O x 3, CNII-XII grossly intact

## 2020-12-01 LAB
ANION GAP SERPL CALC-SCNC: 6 MMOL/L — SIGNIFICANT CHANGE UP (ref 5–17)
BUN SERPL-MCNC: 7 MG/DL — SIGNIFICANT CHANGE UP (ref 7–23)
CALCIUM SERPL-MCNC: 7.6 MG/DL — LOW (ref 8.5–10.1)
CHLORIDE SERPL-SCNC: 112 MMOL/L — HIGH (ref 96–108)
CO2 SERPL-SCNC: 25 MMOL/L — SIGNIFICANT CHANGE UP (ref 22–31)
CREAT SERPL-MCNC: 0.61 MG/DL — SIGNIFICANT CHANGE UP (ref 0.5–1.3)
GLUCOSE SERPL-MCNC: 109 MG/DL — HIGH (ref 70–99)
HCT VFR BLD CALC: 26.3 % — LOW (ref 34.5–45)
HCT VFR BLD CALC: 26.4 % — LOW (ref 34.5–45)
HGB BLD-MCNC: 8.7 G/DL — LOW (ref 11.5–15.5)
HGB BLD-MCNC: 9.1 G/DL — LOW (ref 11.5–15.5)
MCHC RBC-ENTMCNC: 29 PG — SIGNIFICANT CHANGE UP (ref 27–34)
MCHC RBC-ENTMCNC: 33 GM/DL — SIGNIFICANT CHANGE UP (ref 32–36)
MCV RBC AUTO: 88 FL — SIGNIFICANT CHANGE UP (ref 80–100)
PLATELET # BLD AUTO: 219 K/UL — SIGNIFICANT CHANGE UP (ref 150–400)
POTASSIUM SERPL-MCNC: 3.7 MMOL/L — SIGNIFICANT CHANGE UP (ref 3.5–5.3)
POTASSIUM SERPL-SCNC: 3.7 MMOL/L — SIGNIFICANT CHANGE UP (ref 3.5–5.3)
RBC # BLD: 3 M/UL — LOW (ref 3.8–5.2)
RBC # FLD: 12 % — SIGNIFICANT CHANGE UP (ref 10.3–14.5)
SODIUM SERPL-SCNC: 143 MMOL/L — SIGNIFICANT CHANGE UP (ref 135–145)
WBC # BLD: 8.61 K/UL — SIGNIFICANT CHANGE UP (ref 3.8–10.5)
WBC # FLD AUTO: 8.61 K/UL — SIGNIFICANT CHANGE UP (ref 3.8–10.5)

## 2020-12-01 PROCEDURE — 99024 POSTOP FOLLOW-UP VISIT: CPT

## 2020-12-01 RX ORDER — ENOXAPARIN SODIUM 100 MG/ML
40 INJECTION SUBCUTANEOUS
Qty: 30 | Refills: 0 | Status: DISCONTINUED | COMMUNITY
Start: 2020-11-30 | End: 2020-12-01

## 2020-12-01 RX ORDER — ACETAMINOPHEN 500 MG
650 TABLET ORAL EVERY 6 HOURS
Refills: 0 | Status: DISCONTINUED | OUTPATIENT
Start: 2020-12-01 | End: 2020-12-02

## 2020-12-01 RX ORDER — PROCHLORPERAZINE MALEATE 5 MG
10 TABLET ORAL ONCE
Refills: 0 | Status: COMPLETED | OUTPATIENT
Start: 2020-12-01 | End: 2020-12-01

## 2020-12-01 RX ORDER — SODIUM CHLORIDE 9 MG/ML
1000 INJECTION INTRAMUSCULAR; INTRAVENOUS; SUBCUTANEOUS ONCE
Refills: 0 | Status: COMPLETED | OUTPATIENT
Start: 2020-12-01 | End: 2020-12-01

## 2020-12-01 RX ADMIN — Medication 75 MICROGRAM(S): at 05:48

## 2020-12-01 RX ADMIN — Medication 15 MILLIGRAM(S): at 12:02

## 2020-12-01 RX ADMIN — ENOXAPARIN SODIUM 40 MILLIGRAM(S): 100 INJECTION SUBCUTANEOUS at 22:42

## 2020-12-01 RX ADMIN — Medication 400 MILLIGRAM(S): at 04:24

## 2020-12-01 RX ADMIN — Medication 1000 MILLIGRAM(S): at 17:00

## 2020-12-01 RX ADMIN — FAMOTIDINE 20 MILLIGRAM(S): 10 INJECTION INTRAVENOUS at 22:42

## 2020-12-01 RX ADMIN — SODIUM CHLORIDE 1000 MILLILITER(S): 9 INJECTION INTRAMUSCULAR; INTRAVENOUS; SUBCUTANEOUS at 01:00

## 2020-12-01 RX ADMIN — Medication 1000 MILLIGRAM(S): at 10:30

## 2020-12-01 RX ADMIN — Medication 15 MILLIGRAM(S): at 12:30

## 2020-12-01 RX ADMIN — Medication 100 MILLIGRAM(S): at 18:13

## 2020-12-01 RX ADMIN — Medication 15 MILLIGRAM(S): at 00:00

## 2020-12-01 RX ADMIN — Medication 15 MILLIGRAM(S): at 18:17

## 2020-12-01 RX ADMIN — Medication 15 MILLIGRAM(S): at 23:04

## 2020-12-01 RX ADMIN — Medication 650 MILLIGRAM(S): at 22:41

## 2020-12-01 RX ADMIN — SODIUM CHLORIDE 100 MILLILITER(S): 9 INJECTION, SOLUTION INTRAVENOUS at 05:49

## 2020-12-01 RX ADMIN — FAMOTIDINE 20 MILLIGRAM(S): 10 INJECTION INTRAVENOUS at 12:02

## 2020-12-01 RX ADMIN — Medication 15 MILLIGRAM(S): at 19:40

## 2020-12-01 RX ADMIN — Medication 400 MILLIGRAM(S): at 10:08

## 2020-12-01 RX ADMIN — Medication 10 MILLIGRAM(S): at 00:20

## 2020-12-01 RX ADMIN — Medication 100 MILLIGRAM(S): at 08:30

## 2020-12-01 RX ADMIN — Medication 1000 MILLIGRAM(S): at 05:00

## 2020-12-01 RX ADMIN — SODIUM CHLORIDE 100 MILLILITER(S): 9 INJECTION, SOLUTION INTRAVENOUS at 02:06

## 2020-12-01 RX ADMIN — Medication 15 MILLIGRAM(S): at 05:48

## 2020-12-01 RX ADMIN — SENNA PLUS 2 TABLET(S): 8.6 TABLET ORAL at 22:43

## 2020-12-01 RX ADMIN — ENOXAPARIN SODIUM 40 MILLIGRAM(S): 100 INJECTION SUBCUTANEOUS at 10:08

## 2020-12-01 RX ADMIN — Medication 400 MILLIGRAM(S): at 16:20

## 2020-12-01 RX ADMIN — Medication 15 MILLIGRAM(S): at 06:14

## 2020-12-01 NOTE — PROGRESS NOTE ADULT - SUBJECTIVE AND OBJECTIVE BOX
Patient had some nausea overnight associated with mild hypotension. Responded to 1 liter bolus and antiemetics. PA noted some bloody drainage in right abdominal drain and mild right abdominal ecchymosis by the incision.   vss/a JPs over last 12 hours = 84/131/68/70 currently serosang    flaps viable  mastectomy skin viable  abdomen cdi with mild right sided ecchymosis  umbilicus appears viable    Hct 26 down from 45.    Patient may have had some oozing in right abdominal donor site. No obvious collection. Currently comfortable and stable, without complaints and in good spirits.  +may transfer to floor  +check AM Hct  +follow flaps and drain output  -will d/c francia later this morning  -advance to regular diet  +OOB to chair and then ambulate with assistance. Patient had some nausea overnight associated with mild hypotension. Responded to 1 liter bolus and antiemetics. PA noted some bloody drainage in right abdominal drain and mild right abdominal ecchymosis by the incision.   vss/a JPs over last 12 hours = 84/131/68/70 currently serosang    flaps viable  mastectomy skin viable  abdomen cdi with mild right sided ecchymosis  umbilicus appears viable    Hct 26 down from 45.This is consistent with postoperative anemia due to blood loss.    Patient may have had some oozing in right abdominal donor site. No obvious collection. Currently comfortable and stable, without complaints and in good spirits.  +may transfer to floor  +check AM Hct  +follow flaps and drain output  -will d/c feldman later this morning  -advance to regular diet  +OOB to chair and then ambulate with assistance.

## 2020-12-01 NOTE — PROGRESS NOTE ADULT - SUBJECTIVE AND OBJECTIVE BOX
S:  Alert, comfortable. Some nausea during night.    O: Afeb      BP dropped to 80 during night, but responded to IVF      Breasts soft.  Flap sta 53/60%      Abdomen soft.  Incision clean.      Hct 26.3 @ 1 am--  labs pending this am

## 2020-12-01 NOTE — PROGRESS NOTE ADULT - SUBJECTIVE AND OBJECTIVE BOX
Post-op:  S/P Mastectomy, bilateral, skin sparing, Breast reconstruction with EJ free flap     called by RN around midnight regarding pt having a drop in BP to 90/60 and a borderline urine output of about 30cc/hr.  I ordered a 1liter NS fluid bolus.  Approximately 1 hour later, BP still not improved so I ordered a STAT Hgb&Hct.      On evaluation, pt is awake and alert had an episode of nausea and feeling very warm otherwise without complaints    PE:  T(C): 36.7 (12-01-20 @ 00:00), Max: 36.7 (11-30-20 @ 06:58)  HR: 84 (12-01-20 @ 01:00) (68 - 90)  BP: 100/41 (12-01-20 @ 01:00) (82/35 - 136/76)  RR: 15 (12-01-20 @ 01:00) (10 - 19)  SpO2: 99% (12-01-20 @ 01:00) (98% - 100%)  Wt(kg): --    UO:  355cc    Bilateral breasts:  symmetrical, vioptics in place, no ecchymosis or hematoma, soft, MARY's in place and functioning                       MARY right:  79cc                         MARY left:  60cc  Abdomen:  soft, development of moderate ecchymosis along the right side suture line, soft, non-tender.                       MARY right:  106cc                       MARY left:  63cc                            9.1    x     )-----------( x        ( 01 Dec 2020 01:06 )             26.3

## 2020-12-01 NOTE — PROGRESS NOTE ADULT - ASSESSMENT
Ass:  S/P Mastectomy, bilateral, skin sparing, Breast reconstruction with EJ free flap   Plan:  Discussed with Dr. Noe.  Will continue to monitor in PACU.  Will increase IVF and repeat labs in am. Ass:  Post-operative anemia due to blood loss, S/P Mastectomy, bilateral, skin sparing, Breast reconstruction with EJ free flap   Plan:  Discussed with Dr. Noe.  Will continue to monitor in PACU.  Will increase IVF and repeat labs in am.

## 2020-12-02 ENCOUNTER — TRANSCRIPTION ENCOUNTER (OUTPATIENT)
Age: 50
End: 2020-12-02

## 2020-12-02 VITALS — DIASTOLIC BLOOD PRESSURE: 60 MMHG | SYSTOLIC BLOOD PRESSURE: 96 MMHG

## 2020-12-02 RX ORDER — ACETAMINOPHEN 500 MG
2 TABLET ORAL
Qty: 0 | Refills: 0 | DISCHARGE
Start: 2020-12-02

## 2020-12-02 RX ADMIN — Medication 15 MILLIGRAM(S): at 05:44

## 2020-12-02 RX ADMIN — Medication 15 MILLIGRAM(S): at 12:07

## 2020-12-02 RX ADMIN — Medication 650 MILLIGRAM(S): at 04:28

## 2020-12-02 RX ADMIN — FAMOTIDINE 20 MILLIGRAM(S): 10 INJECTION INTRAVENOUS at 10:56

## 2020-12-02 RX ADMIN — ENOXAPARIN SODIUM 40 MILLIGRAM(S): 100 INJECTION SUBCUTANEOUS at 10:56

## 2020-12-02 RX ADMIN — Medication 75 MICROGRAM(S): at 05:44

## 2020-12-02 RX ADMIN — Medication 650 MILLIGRAM(S): at 10:55

## 2020-12-02 NOTE — DISCHARGE NOTE NURSING/CASE MANAGEMENT/SOCIAL WORK - PATIENT PORTAL LINK FT
You can access the FollowMyHealth Patient Portal offered by [facility] by registering at the following website: [email or web address]. By joining [employer]’s FollowMyHealth portal, you will also be able to view your health information using other applications (apps) compatible with our system.

## 2020-12-02 NOTE — DISCHARGE NOTE PROVIDER - HOSPITAL COURSE
Pt admitted after bl mastectomy with axillary sentinel node biopsies, internal mammary lymph node biopsies and EJ flaps for right invasive ductal carcinoma of the breast.  Postop pt hypotensive, low urine output with drop in H&H demonstrating postop anemia secondary to blood loss. Pt bolused 1 L of fluid and urine output and BP improved and H&H stable. Pt tolerating diet, walking and pain controlled

## 2020-12-02 NOTE — PROGRESS NOTE ADULT - REASON FOR ADMISSION
bilateral mastectomies and EJ flaps
bilateral mastectomies, axillary sentinel node biopsies, internal mammary lymph node biopsies and EJ flaps.
Bilateral mastectomies/EJ
Invasive ductal carcinoma of right breast
Right breast cancer

## 2020-12-02 NOTE — PROGRESS NOTE ADULT - SUBJECTIVE AND OBJECTIVE BOX
Patient without complaints. Ambulating, eating, voiding and pain well controlled.  vss/a jps mod serosang output  flaps viable  mastectomy skin with some areas of minor ecchymosis inferiorly, right more than left  abdomen and umbilicus appear viable    Doing well.  -reviewed sleeping position, bathing, no hot or cold packs, drain care, ambulation and activity restrictions, meds and f/u.  -if patient continued stable today, may go home this afternoon.

## 2020-12-02 NOTE — DISCHARGE NOTE PROVIDER - NSDCFUSCHEDAPPT_GEN_ALL_CORE_FT
CHRISTIAN CRYSTAL ; 12/04/2020 ; [facility]  CHRISTIAN CRYSTAL ; 12/08/2020 ; [facility] Med Breast [address]   CHRISTIAN CRYSTAL ; 12/21/2020 ; [facility]
Never smoker

## 2020-12-02 NOTE — DISCHARGE NOTE PROVIDER - NSDCCPCAREPLAN_GEN_ALL_CORE_FT
PRINCIPAL DISCHARGE DIAGNOSIS  Diagnosis: Invasive ductal carcinoma of right breast  Assessment and Plan of Treatment:

## 2020-12-02 NOTE — DISCHARGE NOTE PROVIDER - NSDCMRMEDTOKEN_GEN_ALL_CORE_FT
acetaminophen 325 mg oral tablet: 2 tab(s) orally every 6 hours, As needed, Moderate Pain (4 - 6)  levothyroxine 75 mcg (0.075 mg) oral tablet: 1 tab(s) orally once a day

## 2020-12-02 NOTE — PROGRESS NOTE ADULT - ASSESSMENT
S/p bilateral mastectomies/SLN biopsies/EJ for right breast cancer.  Increase activity.  Possible discharge later today.

## 2020-12-02 NOTE — DISCHARGE NOTE PROVIDER - NSDCCPTREATMENT_GEN_ALL_CORE_FT
PRINCIPAL PROCEDURE  Procedure: Mastectomy, bilateral, skin sparing  Findings and Treatment:       SECONDARY PROCEDURE  Procedure: Biopsy or excision, lymph node, sentinel, axillary, deep  Findings and Treatment:     Procedure: Mastectomy, bilateral, skin sparing  Findings and Treatment:

## 2020-12-02 NOTE — CDI QUERY NOTE - NSCDIOTHERTXTBX_GEN_ALL_CORE_HH
S/P Bilateral Mastectomy with EJ.    Post-OP:    12/1:  BP 82/35: Lowest BP    Pt having a drop in BP to 90/60 and a borderline urine output of about 30cc/hr.   1liter NS fluid bolus.      12/1: vss/a JPs over last 12 hours = 84/131/68/70 currently serosang    Hct 26 down from 45.    HEMOGLOBIN LEVELS:  Hemoglobin: 8.7 g/dL <L> [11.5 - 15.5] (12-01-20)  Hemoglobin: 9.1 g/dL <L> [11.5 - 15.5] (12-01-20)  Hemoglobin: 14.7 g/dL [11.5 - 15.5] (11-24-20)    Please clarify a diagnosis based on the above clinical criteria:     1.) Postoperative anemia due to blood loss  2.) Acute blood loss anemia  3.) Other (please specify)  4.) Not clinically significant

## 2020-12-02 NOTE — DISCHARGE NOTE PROVIDER - CARE PROVIDER_API CALL
Gabriela Monge  SURGERY  [address] [facility] [place] [place] [address]  Phone: [phone]  Fax: [phone]  Follow Up Time:

## 2020-12-02 NOTE — DISCHARGE NOTE PROVIDER - NSDCFUADDINST_GEN_ALL_CORE_FT
Showering ok  No heavy lifting  No driving if taking pain medication  Follow up with Dr Waldrop within 1 week

## 2020-12-02 NOTE — PROGRESS NOTE ADULT - SUBJECTIVE AND OBJECTIVE BOX
S:  Alert, comfortable.  Ambulated yesterday.    O:  T m 99, VSS       Breasts soft.  Some ecchymoses right>left.       Flap sats 45/80%       Abdomen soft.  Ecchymoses right lateral.      Drains serosang.

## 2020-12-03 ENCOUNTER — NON-APPOINTMENT (OUTPATIENT)
Age: 50
End: 2020-12-03

## 2020-12-04 DIAGNOSIS — I95.81 POSTPROCEDURAL HYPOTENSION: ICD-10-CM

## 2020-12-04 DIAGNOSIS — Z79.890 HORMONE REPLACEMENT THERAPY: ICD-10-CM

## 2020-12-04 DIAGNOSIS — K43.2 INCISIONAL HERNIA WITHOUT OBSTRUCTION OR GANGRENE: ICD-10-CM

## 2020-12-04 DIAGNOSIS — Z40.01 ENCOUNTER FOR PROPHYLACTIC REMOVAL OF BREAST: ICD-10-CM

## 2020-12-04 DIAGNOSIS — E03.9 HYPOTHYROIDISM, UNSPECIFIED: ICD-10-CM

## 2020-12-04 DIAGNOSIS — D62 ACUTE POSTHEMORRHAGIC ANEMIA: ICD-10-CM

## 2020-12-04 DIAGNOSIS — Z17.0 ESTROGEN RECEPTOR POSITIVE STATUS [ER+]: ICD-10-CM

## 2020-12-04 DIAGNOSIS — C50.411 MALIGNANT NEOPLASM OF UPPER-OUTER QUADRANT OF RIGHT FEMALE BREAST: ICD-10-CM

## 2020-12-08 ENCOUNTER — APPOINTMENT (OUTPATIENT)
Dept: BREAST CENTER | Facility: CLINIC | Age: 50
End: 2020-12-08
Payer: COMMERCIAL

## 2020-12-08 VITALS
HEART RATE: 91 BPM | SYSTOLIC BLOOD PRESSURE: 115 MMHG | HEIGHT: 65 IN | BODY MASS INDEX: 43.65 KG/M2 | DIASTOLIC BLOOD PRESSURE: 66 MMHG | WEIGHT: 262 LBS

## 2020-12-08 PROCEDURE — 99024 POSTOP FOLLOW-UP VISIT: CPT

## 2020-12-08 RX ORDER — OXYCODONE 5 MG/1
5 TABLET ORAL
Qty: 30 | Refills: 0 | Status: DISCONTINUED | COMMUNITY
Start: 2020-11-25 | End: 2020-12-08

## 2020-12-08 RX ORDER — MAGNESIUM HYDROXIDE 400 MG/5ML
400 SUSPENSION ORAL
Qty: 1 | Refills: 0 | Status: DISCONTINUED | COMMUNITY
Start: 2020-11-25 | End: 2020-12-08

## 2020-12-08 RX ORDER — DOCUSATE SODIUM 100 MG/1
100 CAPSULE ORAL TWICE DAILY
Qty: 20 | Refills: 0 | Status: DISCONTINUED | COMMUNITY
Start: 2020-11-25 | End: 2020-12-08

## 2020-12-08 NOTE — HISTORY OF PRESENT ILLNESS
[FreeTextEntry1] : This is a 50 year old  female who was found to have an abnormality on her routine right mammogram and ultrasound .  An ultrasound guided core biopsy showed a moderately differentiated invasive ductal carcinoma, ER +MN+Her 2 neg tumor. Oncotype scar is 16.\par \par She has had previous needle biopsies and cyst aspirations of her breasts.  \par \par She opted to undergo bilateral mastectomies with  EJ reconstruction on 11/30/2020.  She is using bacitracin on the breast wounds.  She is only taking Tylenol for pain.  She is ambulating.

## 2020-12-08 NOTE — PHYSICAL EXAM
[No Cervical Adenopathy] : no cervical adenopathy [Normal S1, S2] : normal S1 and S2 [Bra Size: ___] : Bra Size: [unfilled] [Grade 3] : Ptosis Grade 3 [de-identified] : Circular skin island with reduction pattern incision.  Circular islands of skin necrosis with eschar formation medially up to 1 cm.  Bacitracin and dressing applied. Drain serous. [de-identified] : Circular skin island with reduction pattern incisions.  Minimal blistering lower outer- Bacitracin applied. Drain serous. [de-identified] : Low transverse incision healing well. Right drain removed.  Resolving ecchymoses lower lateral. Left drain serosang.

## 2020-12-08 NOTE — DATA REVIEWED
[FreeTextEntry1] : Bilateral mammogram 10/7/2020:  Right UOQ anterior distortion.\par \par Bilateral breast ultrasound 10/7/2020:  Right 11:00 N1 irregular hypoechoic nodular tissue with increased vascularity.  Bilateral stable hypoechoic nodules.\par \par Right breast US guided core biopsy 10/14/2020 11:00- venus clip\par Pathology= mod differentiated invasive ductal carcinoma SBR 6/9 1.1 cm, ki 67 15-20%  ER >90% [place]>90% Her 2 neg\par \par Surgical pathology 11/30/2020:  Left mastectomy= benign, SLN neg\par                                                    Right mastectomy 1.5 cm invasive cancer, no LVI, SLN x2 and non SLN neg.

## 2020-12-08 NOTE — PAST MEDICAL HISTORY
[Menarche Age ____] : age at menarche was [unfilled] [Total Preg ___] : G[unfilled] [Live Births ___] : P[unfilled]  [Age At Live Birth ___] : Age at live birth: [unfilled] [FreeTextEntry2] : son [FreeTextEntry7] : no [FreeTextEntry8] : x 13 months

## 2020-12-11 ENCOUNTER — APPOINTMENT (OUTPATIENT)
Dept: PLASTIC SURGERY | Facility: CLINIC | Age: 50
End: 2020-12-11
Payer: COMMERCIAL

## 2020-12-11 VITALS
TEMPERATURE: 97.5 F | HEART RATE: 89 BPM | SYSTOLIC BLOOD PRESSURE: 116 MMHG | BODY MASS INDEX: 42.49 KG/M2 | OXYGEN SATURATION: 100 % | HEIGHT: 65 IN | DIASTOLIC BLOOD PRESSURE: 74 MMHG | WEIGHT: 255 LBS

## 2020-12-11 PROCEDURE — 99024 POSTOP FOLLOW-UP VISIT: CPT

## 2020-12-11 RX ORDER — SILVER SULFADIAZINE 10 MG/G
1 CREAM TOPICAL TWICE DAILY
Qty: 1 | Refills: 0 | Status: DISCONTINUED | COMMUNITY
Start: 2020-12-08 | End: 2020-12-11

## 2020-12-11 NOTE — HISTORY OF PRESENT ILLNESS
[FreeTextEntry1] : This is a 50 year old  female who was found to have an abnormality on her routine right mammogram and ultrasound . An ultrasound guided core biopsy showed a moderately differentiated invasive ductal carcinoma, ER +GA+Her 2 neg tumor. Oncotype scar is 16.\par \par She has had previous needle biopsies and cyst aspirations of her breasts. \par \par She opted to undergo bilateral mastectomies with EJ reconstruction on 11/30/2020. She is using bacitracin on the breast wounds. She is only taking Tylenol for pain. She is ambulating. \par \par She returns for follow up.

## 2020-12-11 NOTE — PHYSICAL EXAM
[de-identified] : bilateral alexey flaps viable and soft. Right mastectomy flap with inferomedial eschar. Left flap with some areas of inferior epidermolysis.  [de-identified] : incisions cdi and umbilicus viable. Resolving ecchymosis on right lateral incision. MARY with old blood.

## 2020-12-15 ENCOUNTER — APPOINTMENT (OUTPATIENT)
Dept: BREAST CENTER | Facility: CLINIC | Age: 50
End: 2020-12-15
Payer: COMMERCIAL

## 2020-12-15 VITALS
DIASTOLIC BLOOD PRESSURE: 54 MMHG | WEIGHT: 251 LBS | SYSTOLIC BLOOD PRESSURE: 105 MMHG | BODY MASS INDEX: 41.82 KG/M2 | HEIGHT: 65 IN | HEART RATE: 77 BPM

## 2020-12-15 PROCEDURE — 99024 POSTOP FOLLOW-UP VISIT: CPT

## 2020-12-15 RX ORDER — ONDANSETRON 4 MG/1
4 TABLET, ORALLY DISINTEGRATING ORAL EVERY 6 HOURS
Qty: 30 | Refills: 0 | Status: DISCONTINUED | COMMUNITY
Start: 2020-11-25 | End: 2020-12-15

## 2020-12-15 RX ORDER — KETOROLAC TROMETHAMINE 10 MG/1
10 TABLET, FILM COATED ORAL EVERY 6 HOURS
Qty: 20 | Refills: 0 | Status: DISCONTINUED | COMMUNITY
Start: 2020-11-25 | End: 2020-12-15

## 2020-12-15 NOTE — HISTORY OF PRESENT ILLNESS
[FreeTextEntry1] : This is a 50 year old  female who was found to have an abnormality on her routine right mammogram and ultrasound .  An ultrasound guided core biopsy showed a moderately differentiated invasive ductal carcinoma, ER +NJ+Her 2 neg tumor. Oncotype scar is 16.\par \par She has had previous needle biopsies and cyst aspirations of her breasts.  \par \par She opted to undergo bilateral mastectomies with  EJ reconstruction on 11/30/2020.  She is using silvadene on the breast wounds.  The two breast drains were removed.  The left hip is still draining a large amount.

## 2020-12-15 NOTE — PHYSICAL EXAM
[No Cervical Adenopathy] : no cervical adenopathy [Normal S1, S2] : normal S1 and S2 [Bra Size: ___] : Bra Size: [unfilled] [Grade 3] : Ptosis Grade 3 [de-identified] : Circular skin island with reduction pattern incision.  4-5 cm eschar lower inner.  Blistering inferior toward inframammary fold 6:00.  No erythema. [de-identified] : Circular skin island with reduction pattern incisions.  Minimal dry eschar LOQ. [de-identified] : Low transverse incision healing well.  Resolving ecchymoses lower lateral. Left drain dark- old blood.

## 2020-12-15 NOTE — DATA REVIEWED
[FreeTextEntry1] : Bilateral mammogram 10/7/2020:  Right UOQ anterior distortion.\par \par Bilateral breast ultrasound 10/7/2020:  Right 11:00 N1 irregular hypoechoic nodular tissue with increased vascularity.  Bilateral stable hypoechoic nodules.\par \par Right breast US guided core biopsy 10/14/2020 11:00- venus clip\par Pathology= mod differentiated invasive ductal carcinoma SBR 6/9 1.1 cm, ki 67 15-20%  ER >90% CT>90% Her 2 neg\par \par Surgical pathology 11/30/2020:  Left mastectomy= benign, SLN neg\par                                                    Right mastectomy 1.5 cm invasive cancer, no LVI, SLN x2 and non SLN neg.

## 2020-12-17 DIAGNOSIS — L72.11 PILAR CYST: ICD-10-CM

## 2020-12-17 DIAGNOSIS — L71.0 PERIORAL DERMATITIS: ICD-10-CM

## 2020-12-17 DIAGNOSIS — Z85.3 PERSONAL HISTORY OF MALIGNANT NEOPLASM OF BREAST: ICD-10-CM

## 2020-12-18 ENCOUNTER — OUTPATIENT (OUTPATIENT)
Dept: OUTPATIENT SERVICES | Facility: HOSPITAL | Age: 50
LOS: 1 days | Discharge: HOME | End: 2020-12-18

## 2020-12-18 DIAGNOSIS — C50.919 MALIGNANT NEOPLASM OF UNSPECIFIED SITE OF UNSPECIFIED FEMALE BREAST: ICD-10-CM

## 2020-12-18 DIAGNOSIS — Z83.79 FAMILY HISTORY OF OTHER DISEASES OF THE DIGESTIVE SYSTEM: Chronic | ICD-10-CM

## 2020-12-21 ENCOUNTER — APPOINTMENT (OUTPATIENT)
Age: 50
End: 2020-12-21
Payer: COMMERCIAL

## 2020-12-21 ENCOUNTER — APPOINTMENT (OUTPATIENT)
Dept: PLASTIC SURGERY | Facility: CLINIC | Age: 50
End: 2020-12-21
Payer: COMMERCIAL

## 2020-12-21 ENCOUNTER — RESULT REVIEW (OUTPATIENT)
Age: 50
End: 2020-12-21

## 2020-12-21 VITALS
HEART RATE: 89 BPM | SYSTOLIC BLOOD PRESSURE: 108 MMHG | HEIGHT: 65 IN | DIASTOLIC BLOOD PRESSURE: 69 MMHG | WEIGHT: 248 LBS | BODY MASS INDEX: 41.32 KG/M2 | TEMPERATURE: 97.2 F

## 2020-12-21 VITALS — SYSTOLIC BLOOD PRESSURE: 105 MMHG | DIASTOLIC BLOOD PRESSURE: 54 MMHG

## 2020-12-21 DIAGNOSIS — N60.19 DIFFUSE CYSTIC MASTOPATHY OF UNSPECIFIED BREAST: ICD-10-CM

## 2020-12-21 LAB
ALBUMIN SERPL ELPH-MCNC: 3.9 G/DL — SIGNIFICANT CHANGE UP (ref 3.3–5)
ALP SERPL-CCNC: 99 U/L — SIGNIFICANT CHANGE UP (ref 40–120)
ALT FLD-CCNC: 17 U/L — SIGNIFICANT CHANGE UP (ref 10–45)
ANION GAP SERPL CALC-SCNC: 11 MMOL/L — SIGNIFICANT CHANGE UP (ref 5–17)
AST SERPL-CCNC: 26 U/L — SIGNIFICANT CHANGE UP (ref 10–40)
BASOPHILS # BLD AUTO: 0.11 K/UL — SIGNIFICANT CHANGE UP (ref 0–0.2)
BASOPHILS NFR BLD AUTO: 1.6 % — SIGNIFICANT CHANGE UP (ref 0–2)
BILIRUB SERPL-MCNC: 0.3 MG/DL — SIGNIFICANT CHANGE UP (ref 0.2–1.2)
BUN SERPL-MCNC: 11 MG/DL — SIGNIFICANT CHANGE UP (ref 7–23)
CALCIUM SERPL-MCNC: 8.8 MG/DL — SIGNIFICANT CHANGE UP (ref 8.4–10.5)
CHLORIDE SERPL-SCNC: 103 MMOL/L — SIGNIFICANT CHANGE UP (ref 96–108)
CO2 SERPL-SCNC: 24 MMOL/L — SIGNIFICANT CHANGE UP (ref 22–31)
CREAT SERPL-MCNC: 0.9 MG/DL — SIGNIFICANT CHANGE UP (ref 0.5–1.3)
EOSINOPHIL # BLD AUTO: 0.4 K/UL — SIGNIFICANT CHANGE UP (ref 0–0.5)
EOSINOPHIL NFR BLD AUTO: 5.9 % — SIGNIFICANT CHANGE UP (ref 0–6)
GLUCOSE SERPL-MCNC: 97 MG/DL — SIGNIFICANT CHANGE UP (ref 70–99)
HCT VFR BLD CALC: 30.6 % — LOW (ref 34.5–45)
HGB BLD-MCNC: 9.6 G/DL — LOW (ref 11.5–15.5)
IMM GRANULOCYTES NFR BLD AUTO: 0.4 % — SIGNIFICANT CHANGE UP (ref 0–1.5)
LYMPHOCYTES # BLD AUTO: 1.89 K/UL — SIGNIFICANT CHANGE UP (ref 1–3.3)
LYMPHOCYTES # BLD AUTO: 28 % — SIGNIFICANT CHANGE UP (ref 13–44)
MCHC RBC-ENTMCNC: 27.4 PG — SIGNIFICANT CHANGE UP (ref 27–34)
MCHC RBC-ENTMCNC: 31.4 GM/DL — LOW (ref 32–36)
MCV RBC AUTO: 87.2 FL — SIGNIFICANT CHANGE UP (ref 80–100)
MONOCYTES # BLD AUTO: 0.39 K/UL — SIGNIFICANT CHANGE UP (ref 0–0.9)
MONOCYTES NFR BLD AUTO: 5.8 % — SIGNIFICANT CHANGE UP (ref 2–14)
NEUTROPHILS # BLD AUTO: 3.93 K/UL — SIGNIFICANT CHANGE UP (ref 1.8–7.4)
NEUTROPHILS NFR BLD AUTO: 58.3 % — SIGNIFICANT CHANGE UP (ref 43–77)
NRBC # BLD: 0 /100 WBCS — SIGNIFICANT CHANGE UP (ref 0–0)
PLATELET # BLD AUTO: 396 K/UL — SIGNIFICANT CHANGE UP (ref 150–400)
POTASSIUM SERPL-MCNC: 3.9 MMOL/L — SIGNIFICANT CHANGE UP (ref 3.5–5.3)
POTASSIUM SERPL-SCNC: 3.9 MMOL/L — SIGNIFICANT CHANGE UP (ref 3.5–5.3)
PROT SERPL-MCNC: 6.4 G/DL — SIGNIFICANT CHANGE UP (ref 6–8.3)
RBC # BLD: 3.51 M/UL — LOW (ref 3.8–5.2)
RBC # FLD: 12.6 % — SIGNIFICANT CHANGE UP (ref 10.3–14.5)
SODIUM SERPL-SCNC: 138 MMOL/L — SIGNIFICANT CHANGE UP (ref 135–145)
WBC # BLD: 6.75 K/UL — SIGNIFICANT CHANGE UP (ref 3.8–10.5)
WBC # FLD AUTO: 6.75 K/UL — SIGNIFICANT CHANGE UP (ref 3.8–10.5)

## 2020-12-21 PROCEDURE — 99205 OFFICE O/P NEW HI 60 MIN: CPT

## 2020-12-21 PROCEDURE — 99024 POSTOP FOLLOW-UP VISIT: CPT

## 2020-12-21 NOTE — PHYSICAL EXAM
[Fully active, able to carry on all pre-disease performance without restriction] : Status 0 - Fully active, able to carry on all pre-disease performance without restriction [Normal] : affect appropriate [de-identified] : Status post bilateral mastectomies; EJ reconstruction; bilateral incisions healing with good granulation tissue.  On the right there is a large per facial eschar periareolar area.

## 2020-12-21 NOTE — ASSESSMENT
[FreeTextEntry1] : Ms. CRYSTAL 's questions were answered to her satisfaction. She  expressed her  understanding and willingness to comply with the above recommendations, and  will return to the office in 6 weeks.\par \par \par \par \par \par \par

## 2020-12-21 NOTE — HISTORY OF PRESENT ILLNESS
[Disease: _____________________] : Disease: [unfilled] [T: ___] : T[unfilled] [N: ___] : N[unfilled] [M: ___] : M[unfilled] [AJCC Stage: ____] : AJCC Stage: [unfilled] [de-identified] : 50F, premenopausal, , with past medical history of fibrocystic breasts, adenomatous nodular goiter, hypothyroidism, and with right breast moderately differentiated infiltrating ductal carcinoma, ER/WV+, Her 2 negative diagnosed  in 2020.\par CASE SYNOPSIS:\par 10/7/20- mammogram ([facility]): Bilateral scattered patchy areas of asymmetric breast tissue.  In the upper outer quadrant of the right breast anteriorly, distortion is noted.  Spot compression views and a true lateral view obtained with tomosynthesis.  Persistent asymmetric density and distortion is present.  Ultrasound performed for further evaluation\par 10/7/20- breast US: indeterminate asymmetry with distortion at 11:00 in the right breast anteriorly.  Ultrasound-guided core biopsy is recommended.\par 10/16/20 - US guided core biopsy R breast; pathology consistent with infiltrating ductal carcinoma, moderately differentiated, largest contiguous focus is at least 1.2 cm, no definitive LVI, no in situ component identified.\par 10/26/20 - breast MRI: 1.4 cm mass in the upper outer right breast, corresponding the newly diagnosed malignancy.  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  Margins negative, no LCIS or DCIS, 2 SLN + 3 LN not involved by tumor cells, stage I (pT1b, pN0), ER> 90%,WV>90%, Her 2 negative.\par Left breast–ductal hyperplasia, nonatypical, sclerosing adenosis, cyst formation, and microcalcification.  Negative for malignancy.1SLN negative for malignancy.\par \par Patient has recovered well post surgery.  Left breast  and low transverse abdominal incisions healing well, no longer draining.Right reconstructed breast c/w large below areola eschar, superficial. Ms. CRYSTAL's family history is significant for renal cancer in a maternal cousin, lobular breast cancer in her sister, glioblastoma multiforme in her father, who  at 62, history of psoriasis in her brother and sister, history of squamous cell carcinoma of the skin in her father. Patient is single, works for [employer].\par  [de-identified] : infiltrating ductal carcinoma [de-identified] : ER> 90%,MS>90%, Her 2 negative. [FreeTextEntry1] : plan adjuvant HRT

## 2020-12-21 NOTE — CONSULT LETTER
[Dear  ___] : Dear  [unfilled], [Consult Letter:] : I had the pleasure of evaluating your patient, [unfilled]. [Please see my note below.] : Please see my note below. [Consult Closing:] : Thank you very much for allowing me to participate in the care of this patient.  If you have any questions, please do not hesitate to contact me. [Sincerely,] : Sincerely, [DrVlad  ___] : Dr. MINA [DrVlad ___] : Dr. MINA [___] : [unfilled] [FreeTextEntry2] : Gabriela Campos M.D.-  [FreeTextEntry3] : RICK WALLACE M.D.\par Medical Oncology\par [facility] [place]\par [facility]\par [address] [place] [address], [facility]\par [place] [address]\par Telephone: [phone]\par FAX: [phone]\par \par

## 2020-12-22 LAB
24R-OH-CALCIDIOL SERPL-MCNC: 20.9 NG/ML — LOW (ref 30–80)
CANCER AG27-29 SERPL-ACNC: 12.6 U/ML — SIGNIFICANT CHANGE UP (ref 0–38.6)

## 2020-12-30 ENCOUNTER — APPOINTMENT (OUTPATIENT)
Dept: PLASTIC SURGERY | Facility: CLINIC | Age: 50
End: 2020-12-30
Payer: COMMERCIAL

## 2020-12-30 VITALS
OXYGEN SATURATION: 99 % | BODY MASS INDEX: 41.32 KG/M2 | HEIGHT: 65 IN | TEMPERATURE: 97.8 F | SYSTOLIC BLOOD PRESSURE: 123 MMHG | WEIGHT: 248 LBS | DIASTOLIC BLOOD PRESSURE: 79 MMHG | HEART RATE: 75 BPM

## 2020-12-30 PROCEDURE — 99024 POSTOP FOLLOW-UP VISIT: CPT

## 2020-12-31 ENCOUNTER — RESULT REVIEW (OUTPATIENT)
Age: 50
End: 2020-12-31

## 2021-01-05 ENCOUNTER — APPOINTMENT (OUTPATIENT)
Dept: PLASTIC SURGERY | Facility: CLINIC | Age: 51
End: 2021-01-05
Payer: COMMERCIAL

## 2021-01-05 VITALS
SYSTOLIC BLOOD PRESSURE: 106 MMHG | HEART RATE: 82 BPM | HEIGHT: 65 IN | WEIGHT: 250 LBS | OXYGEN SATURATION: 97 % | BODY MASS INDEX: 41.65 KG/M2 | DIASTOLIC BLOOD PRESSURE: 71 MMHG | TEMPERATURE: 98.2 F

## 2021-01-05 PROCEDURE — 99024 POSTOP FOLLOW-UP VISIT: CPT

## 2021-01-08 ENCOUNTER — APPOINTMENT (OUTPATIENT)
Dept: PLASTIC SURGERY | Facility: CLINIC | Age: 51
End: 2021-01-08
Payer: COMMERCIAL

## 2021-01-08 VITALS
HEIGHT: 65 IN | OXYGEN SATURATION: 99 % | TEMPERATURE: 98 F | SYSTOLIC BLOOD PRESSURE: 118 MMHG | HEART RATE: 70 BPM | DIASTOLIC BLOOD PRESSURE: 76 MMHG | BODY MASS INDEX: 41.65 KG/M2 | WEIGHT: 250 LBS

## 2021-01-08 PROCEDURE — 99024 POSTOP FOLLOW-UP VISIT: CPT

## 2021-01-10 LAB — SURGICAL PATHOLOGY STUDY: SIGNIFICANT CHANGE UP

## 2021-01-15 ENCOUNTER — APPOINTMENT (OUTPATIENT)
Dept: BREAST CENTER | Facility: CLINIC | Age: 51
End: 2021-01-15
Payer: COMMERCIAL

## 2021-01-15 VITALS
WEIGHT: 250 LBS | HEART RATE: 87 BPM | SYSTOLIC BLOOD PRESSURE: 124 MMHG | DIASTOLIC BLOOD PRESSURE: 67 MMHG | BODY MASS INDEX: 41.65 KG/M2 | HEIGHT: 65 IN

## 2021-01-15 PROCEDURE — 99024 POSTOP FOLLOW-UP VISIT: CPT

## 2021-01-15 RX ORDER — SILVER SULFADIAZINE 10 MG/G
1 CREAM TOPICAL TWICE DAILY
Qty: 2 | Refills: 2 | Status: DISCONTINUED | COMMUNITY
Start: 2020-12-11 | End: 2021-01-15

## 2021-01-15 RX ORDER — ENOXAPARIN SODIUM 100 MG/ML
40 INJECTION SUBCUTANEOUS
Qty: 30 | Refills: 0 | Status: DISCONTINUED | COMMUNITY
Start: 2020-12-01 | End: 2021-01-15

## 2021-01-15 NOTE — PHYSICAL EXAM
[No Cervical Adenopathy] : no cervical adenopathy [Normal S1, S2] : normal S1 and S2 [Bra Size: ___] : Bra Size: [unfilled] [Grade 3] : Ptosis Grade 3 [de-identified] : Circular skin island with reduction pattern incisions.  Minimal open areas 6:00 incision. [de-identified] : Circular skin island with reduction pattern incision.  6 cm open granulating wound.  Necrotic fat debrided from lateral aspect. Redressed. [de-identified] : Low transverse incision healing well.

## 2021-01-15 NOTE — HISTORY OF PRESENT ILLNESS
[FreeTextEntry1] : This is a 50 year old  female who was found to have an abnormality on her routine right mammogram and ultrasound .  An ultrasound guided core biopsy showed a moderately differentiated invasive ductal carcinoma, ER +VT+Her 2 neg tumor. Oncotype scar is 16.\par \par She has had previous needle biopsies and cyst aspirations of her breasts.  \par \par She opted to undergo bilateral mastectomies with  EJ reconstruction on 11/30/2020. Pathology showed a 1.5 cm invasive cancer, SLN x2 neg. Oncotype score 16.\par \par She is doing wet to dry dressings to the breast wounds. Her mother has been doing the dressings.  The right side has been debrided.  She is boiling water to use for the dressing.\par \par She met with Dr. Eugene and Tamoxifen was advised.

## 2021-01-19 ENCOUNTER — APPOINTMENT (OUTPATIENT)
Dept: PLASTIC SURGERY | Facility: CLINIC | Age: 51
End: 2021-01-19
Payer: COMMERCIAL

## 2021-01-19 VITALS
SYSTOLIC BLOOD PRESSURE: 111 MMHG | HEART RATE: 74 BPM | HEIGHT: 65 IN | BODY MASS INDEX: 41.65 KG/M2 | WEIGHT: 250 LBS | TEMPERATURE: 98.3 F | DIASTOLIC BLOOD PRESSURE: 76 MMHG | OXYGEN SATURATION: 98 %

## 2021-01-19 PROCEDURE — 99024 POSTOP FOLLOW-UP VISIT: CPT

## 2021-01-20 RX ORDER — SOFT LENS RINSE,STORE SOLUTION
SOLUTION, NON-ORAL MISCELLANEOUS
Qty: 5 | Refills: 3 | Status: DISCONTINUED | COMMUNITY
Start: 2021-01-19 | End: 2021-01-20

## 2021-01-21 NOTE — HISTORY OF PRESENT ILLNESS
[FreeTextEntry1] : 50 y.o. F history of hypothyroidism and right breast invasive CA s/p bilateral mastectomies with EJ reconstruction on 11/30/2020 presents today for routine f/u. Patient reports compliance with BID dressing changes. \par

## 2021-01-21 NOTE — PHYSICAL EXAM
[de-identified] : right breast with healthy granulation tissue to mastectomy flap wound. no erythema/warmth/dc. left breast with superficial wound, healthy granulation tissue. wet to dry dressing placed.  [de-identified] : abdomen soft, nt. midlin abdominal incision with superficial wound; healthy tissue, otherwise incision well healed. umbilicus well healed. no infection or collection.

## 2021-01-27 ENCOUNTER — OUTPATIENT (OUTPATIENT)
Dept: OUTPATIENT SERVICES | Facility: HOSPITAL | Age: 51
LOS: 1 days | Discharge: HOME | End: 2021-01-27

## 2021-01-27 DIAGNOSIS — C50.919 MALIGNANT NEOPLASM OF UNSPECIFIED SITE OF UNSPECIFIED FEMALE BREAST: ICD-10-CM

## 2021-01-27 DIAGNOSIS — Z83.79 FAMILY HISTORY OF OTHER DISEASES OF THE DIGESTIVE SYSTEM: Chronic | ICD-10-CM

## 2021-02-02 ENCOUNTER — APPOINTMENT (OUTPATIENT)
Age: 51
End: 2021-02-02

## 2021-02-03 ENCOUNTER — APPOINTMENT (OUTPATIENT)
Age: 51
End: 2021-02-03
Payer: COMMERCIAL

## 2021-02-03 PROCEDURE — 99214 OFFICE O/P EST MOD 30 MIN: CPT | Mod: 95

## 2021-02-08 ENCOUNTER — APPOINTMENT (OUTPATIENT)
Dept: PLASTIC SURGERY | Facility: CLINIC | Age: 51
End: 2021-02-08
Payer: COMMERCIAL

## 2021-02-08 VITALS
BODY MASS INDEX: 41.65 KG/M2 | WEIGHT: 250 LBS | HEART RATE: 76 BPM | SYSTOLIC BLOOD PRESSURE: 136 MMHG | DIASTOLIC BLOOD PRESSURE: 74 MMHG | TEMPERATURE: 97.6 F | HEIGHT: 65 IN | OXYGEN SATURATION: 99 %

## 2021-02-08 PROCEDURE — 99024 POSTOP FOLLOW-UP VISIT: CPT

## 2021-02-08 NOTE — HISTORY OF PRESENT ILLNESS
[FreeTextEntry1] : 50 y.o. F history of hypothyroidism and right breast invasive CA s/p bilateral mastectomies with EJ reconstruction on 11/30/2020 presents today for routine f/u. Patient reports compliance with BID dressing changes. No complaints.

## 2021-02-09 ENCOUNTER — APPOINTMENT (OUTPATIENT)
Dept: BREAST CENTER | Facility: CLINIC | Age: 51
End: 2021-02-09
Payer: COMMERCIAL

## 2021-02-09 VITALS
BODY MASS INDEX: 41.65 KG/M2 | WEIGHT: 250 LBS | SYSTOLIC BLOOD PRESSURE: 135 MMHG | HEART RATE: 75 BPM | HEIGHT: 65 IN | DIASTOLIC BLOOD PRESSURE: 80 MMHG

## 2021-02-09 PROCEDURE — 99024 POSTOP FOLLOW-UP VISIT: CPT

## 2021-02-09 NOTE — PAST MEDICAL HISTORY
[Menarche Age ____] : age at menarche was [unfilled] [Total Preg ___] : G[unfilled] [Live Births ___] : P[unfilled]  [Age At Live Birth ___] : Age at live birth: [unfilled] [FreeTextEntry2] : son [FreeTextEntry8] : x 13 months [FreeTextEntry7] : no

## 2021-02-09 NOTE — PHYSICAL EXAM
[No Cervical Adenopathy] : no cervical adenopathy [Normal S1, S2] : normal S1 and S2 [Bra Size: ___] : Bra Size: [unfilled] [Grade 3] : Ptosis Grade 3 [de-identified] : Circular skin island with reduction pattern incision.  Granulating wound inferior breast cici/reepithelializing.   [de-identified] : Circular skin island with reduction pattern incisions fully healed. [de-identified] : Low transverse incision healing well.

## 2021-02-09 NOTE — HISTORY OF PRESENT ILLNESS
[FreeTextEntry1] : This is a 50 year old  female who was found to have an abnormality on her routine right mammogram and ultrasound .  An ultrasound guided core biopsy showed a moderately differentiated invasive ductal carcinoma, ER +SC+Her 2 neg tumor. Oncotype scar is 16.\par \par She has had previous needle biopsies and cyst aspirations of her breasts.  \par \par She opted to undergo bilateral mastectomies with  EJ reconstruction on 11/30/2020. Pathology showed a 1.5 cm invasive cancer, SLN x2 neg. Oncotype score 16.\par \par She is doing wet to dry dressings to the breast wounds. Her mother has been doing the dressings.  \par \par She met with Dr. Eugene and Tamoxifen was advised.

## 2021-02-09 NOTE — DATA REVIEWED
[FreeTextEntry1] : Bilateral mammogram 10/7/2020:  Right UOQ anterior distortion.\par \par Bilateral breast ultrasound 10/7/2020:  Right 11:00 N1 irregular hypoechoic nodular tissue with increased vascularity.  Bilateral stable hypoechoic nodules.\par \par Right breast US guided core biopsy 10/14/2020 11:00- venus clip\par Pathology= mod differentiated invasive ductal carcinoma SBR 6/9 1.1 cm, ki 67 15-20%  ER >90% IN>90% Her 2 neg\par \par Surgical pathology 11/30/2020:  Left mastectomy= benign, SLN neg\par                                                    Right mastectomy 1.5 cm invasive cancer, no LVI, SLN x2 and non SLN neg.

## 2021-02-11 NOTE — PHYSICAL EXAM
[de-identified] : b/l breast flafps soft, nt. viable and healing well. right mastectomy flap with eschar to inferior aspect, not  at this time.  [de-identified] : abdomen soft, nt. incisions well healed, umbilicus well healed. no collections or infection. MARY drain held to suction with minimal SS output. MARY drain dc'ed and dressing placed with bacitracin + gauze dressing.

## 2021-02-11 NOTE — HISTORY OF PRESENT ILLNESS
[FreeTextEntry1] : 50 y.o. F history of hypothyroidism and right breast invasive CA s/p bilateral mastectomies with EJ reconstruction on 11/30/2020 presents today for routine f/u. Patient reports compliance with BID dressing changes. She is without any complaints and reports to be doing well. \par

## 2021-02-11 NOTE — PHYSICAL EXAM
[de-identified] : b/l breast flafps soft, nt. viable and healing well. right mastectomy flap with eschar to inferior aspect, not  at this time.  [de-identified] : abdomen soft, nt. incisions well healed, umbilicus well healed. no collections or infection.

## 2021-02-11 NOTE — PHYSICAL EXAM
[de-identified] : right breast eschar debrided down to healthy tissue; wet to dry dressing placed. left breast with superficial wound; healhty granulation tissue, wet to dry dressing placed.  [de-identified] : abdomen soft, nt. incision well healed. umbilicus well healed. no collections or infection.

## 2021-02-11 NOTE — PHYSICAL EXAM
[de-identified] : right mastectomy flap breast wound with healthy granulation tissue, smaller in size and healing well. wet to dry dressing placed. left breast with superficial wound to inferior incision, healthy tissue and smaller in size.  [de-identified] : abdomen soft, nt. incisions well healed.

## 2021-02-11 NOTE — HISTORY OF PRESENT ILLNESS
[FreeTextEntry1] : 50 y.o. F history of hypothyroidism and right breast invasive CA s/p bilateral mastectomies with EJ reconstruction on 11/30/2020 presents today for routine f/u. Patient reports compliance with BID dressing changes.

## 2021-02-11 NOTE — HISTORY OF PRESENT ILLNESS
[FreeTextEntry1] : 50 y.o. F history of hypothyroidism and right breast invasive CA s/p bilateral mastectomies with EJ reconstruction on 11/30/2020 presents today for routine f/u. She reports compliance with BID dressing changes.

## 2021-02-11 NOTE — HISTORY OF PRESENT ILLNESS
[FreeTextEntry1] : 50 y.o. F history of hypothryoidism and right breast invasive CA s/p bilateral mastectomies with EJ reconstruction on 11/30/2020 presents today for routine f/u. Alfredo output less than 30cc's in 24 hours. She reports compliance with lovenox and BID dressing changes. No complaints at this time.

## 2021-02-23 ENCOUNTER — RX RENEWAL (OUTPATIENT)
Age: 51
End: 2021-02-23

## 2021-03-03 ENCOUNTER — APPOINTMENT (OUTPATIENT)
Dept: BREAST CENTER | Facility: CLINIC | Age: 51
End: 2021-03-03
Payer: COMMERCIAL

## 2021-03-03 VITALS — DIASTOLIC BLOOD PRESSURE: 75 MMHG | HEART RATE: 69 BPM | SYSTOLIC BLOOD PRESSURE: 123 MMHG

## 2021-03-03 DIAGNOSIS — Z80.3 FAMILY HISTORY OF MALIGNANT NEOPLASM OF BREAST: ICD-10-CM

## 2021-03-03 PROCEDURE — 99213 OFFICE O/P EST LOW 20 MIN: CPT

## 2021-03-03 PROCEDURE — 99072 ADDL SUPL MATRL&STAF TM PHE: CPT

## 2021-03-03 NOTE — PHYSICAL EXAM
[No Cervical Adenopathy] : no cervical adenopathy [Normal S1, S2] : normal S1 and S2 [Bra Size: ___] : Bra Size: [unfilled] [Grade 3] : Ptosis Grade 3 [de-identified] : Circular skin island with reduction pattern incision.  Granulating wound inferior breast cici/reepithelializing.   [de-identified] : Circular skin island with reduction pattern incisions fully healed. 4 cm area of fat necrosis 6:00, 2 cm area medial lower and oval zone of thickening UIQ. [de-identified] : Low transverse incision healing well.

## 2021-03-03 NOTE — HISTORY OF PRESENT ILLNESS
[FreeTextEntry1] : This is a 50 year old  female who was found to have an abnormality on her routine right mammogram and ultrasound .  An ultrasound guided core biopsy showed a moderately differentiated invasive ductal carcinoma, ER +DC+Her 2 neg tumor. Oncotype scar is 16.\par \par She has had previous needle biopsies and cyst aspirations of her breasts.  \par \par She opted to undergo bilateral mastectomies with  EJ reconstruction on 11/30/2020. Pathology showed a 1.5 cm invasive cancer, SLN x2 neg. Oncotype score 16.\par \par She is doing wet to dry dressings to the breast wounds. Her mother has been doing the dressings.  \par \par She met with Dr. Eugene and Tamoxifen was advised.

## 2021-03-03 NOTE — DATA REVIEWED
[FreeTextEntry1] : Bilateral mammogram 10/7/2020:  Right UOQ anterior distortion.\par \par Bilateral breast ultrasound 10/7/2020:  Right 11:00 N1 irregular hypoechoic nodular tissue with increased vascularity.  Bilateral stable hypoechoic nodules.\par \par Right breast US guided core biopsy 10/14/2020 11:00- venus clip\par Pathology= mod differentiated invasive ductal carcinoma SBR 6/9 1.1 cm, ki 67 15-20%  ER >90% [place]>90% Her 2 neg\par \par Surgical pathology 11/30/2020:  Left mastectomy= benign, SLN neg\par                                                    Right mastectomy 1.5 cm invasive cancer, no LVI, SLN x2 and non SLN neg.

## 2021-03-09 ENCOUNTER — APPOINTMENT (OUTPATIENT)
Dept: PLASTIC SURGERY | Facility: CLINIC | Age: 51
End: 2021-03-09
Payer: COMMERCIAL

## 2021-03-09 VITALS
OXYGEN SATURATION: 98 % | DIASTOLIC BLOOD PRESSURE: 82 MMHG | TEMPERATURE: 98.1 F | HEART RATE: 82 BPM | WEIGHT: 250 LBS | HEIGHT: 65 IN | BODY MASS INDEX: 41.65 KG/M2 | SYSTOLIC BLOOD PRESSURE: 114 MMHG

## 2021-03-09 PROCEDURE — 99072 ADDL SUPL MATRL&STAF TM PHE: CPT

## 2021-03-09 PROCEDURE — 99212 OFFICE O/P EST SF 10 MIN: CPT

## 2021-03-27 ENCOUNTER — OUTPATIENT (OUTPATIENT)
Dept: OUTPATIENT SERVICES | Facility: HOSPITAL | Age: 51
LOS: 1 days | Discharge: HOME | End: 2021-03-27

## 2021-03-27 DIAGNOSIS — C50.919 MALIGNANT NEOPLASM OF UNSPECIFIED SITE OF UNSPECIFIED FEMALE BREAST: ICD-10-CM

## 2021-03-27 DIAGNOSIS — Z83.79 FAMILY HISTORY OF OTHER DISEASES OF THE DIGESTIVE SYSTEM: Chronic | ICD-10-CM

## 2021-03-27 NOTE — REVIEW OF SYSTEMS
[Patient Intake Form Reviewed] : Patient intake form was reviewed [Skin Wound] : skin wound [Negative] : Psychiatric [Fever] : no fever [Night Sweats] : no night sweats [Fatigue] : no fatigue [de-identified] : right breast postoperative eschar removed; now with good local granulation tissue

## 2021-03-27 NOTE — HISTORY OF PRESENT ILLNESS
[Home] : at home, [unfilled] , at the time of the visit. [Medical Office: ([facility])___] : at the medical office located in  [Verbal consent obtained from patient] : the patient, [unfilled] [Disease: _____________________] : Disease: [unfilled] [T: ___] : T[unfilled] [N: ___] : N[unfilled] [M: ___] : M[unfilled] [AJCC Stage: ____] : AJCC Stage: [unfilled] [FreeTextEntry4] : NA [de-identified] : 50F, premenopausal, , with past medical history of fibrocystic breasts, adenomatous nodular goiter, hypothyroidism, and with right breast moderately differentiated infiltrating ductal carcinoma, ER/[place]+, Her 2 negative diagnosed  in 2020.\par \par CASE SYNOPSIS:\par 10/7/20- mammogram ([facility]): Bilateral scattered patchy areas of asymmetric breast tissue.  In the upper outer quadrant of the right breast anteriorly, distortion is noted.  Spot compression views and a true lateral view obtained with tomosynthesis.  Persistent asymmetric density and distortion is present.  Ultrasound performed for further evaluation\par 10/7/20- breast US: indeterminate asymmetry with distortion at 11:00 in the right breast anteriorly.  Ultrasound-guided core biopsy is recommended.\par 10/16/20 - US guided core biopsy R breast; pathology consistent with infiltrating ductal carcinoma, moderately differentiated, largest contiguous focus is at least 1.2 cm, no definitive LVI, no in situ component identified.\par 10/26/20 - breast MRI: 1.4 cm mass in the upper outer right breast, corresponding the newly diagnosed malignancy.  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  Margins negative, no LCIS or DCIS, 2 SLN + 3 LN not involved by tumor cells, stage I (pT1b, pN0), ER> 90%,[place]>90%, Her 2 negative.\par Left breast–ductal hyperplasia, nonatypical, sclerosing adenosis, cyst formation, and microcalcification.  Negative for malignancy.1SLN negative for malignancy.\par \par \par  [de-identified] : infiltrating ductal carcinoma [de-identified] : ER> 90%,SD>90%, Her 2 negative. [FreeTextEntry1] : plan adjuvant HRT [de-identified] : Short-term visit for Ms. CRYSTAL who is still recovering from bilateral mastectomy with EJ reconstruction 11/30/2020.  Patient continues to follow-up regularly with Dr. Noe, plastic surgery, for debridement of right breast unhealed wound, which now presents with good granulation tissue.  Did not start SERM medication yet out of concerned of possible adverse effect on wound healing.  Patient is feeling well, more energetic that during the last visit, when her hemoglobin was 9.9 g/dL.  Her appetite has resumed, and she denies B symptoms.  Review of blood work from initial visit consistent with anemia, and low vitamin D; serum tumor marker CA 27–29 was in normal range.

## 2021-03-30 ENCOUNTER — APPOINTMENT (OUTPATIENT)
Dept: BREAST CENTER | Facility: CLINIC | Age: 51
End: 2021-03-30
Payer: COMMERCIAL

## 2021-03-30 VITALS
DIASTOLIC BLOOD PRESSURE: 56 MMHG | SYSTOLIC BLOOD PRESSURE: 123 MMHG | HEIGHT: 65 IN | WEIGHT: 257 LBS | HEART RATE: 77 BPM | BODY MASS INDEX: 42.82 KG/M2

## 2021-03-30 PROCEDURE — 99213 OFFICE O/P EST LOW 20 MIN: CPT

## 2021-03-30 PROCEDURE — 99072 ADDL SUPL MATRL&STAF TM PHE: CPT

## 2021-03-30 RX ORDER — MAG HYDROX/ALUMINUM HYD/SIMETH 400-400-40
0.9 SUSPENSION, ORAL (FINAL DOSE FORM) ORAL
Qty: 5 | Refills: 2 | Status: DISCONTINUED | COMMUNITY
Start: 2021-01-20 | End: 2021-03-30

## 2021-03-30 NOTE — DATA REVIEWED
[FreeTextEntry1] : Bilateral mammogram 10/7/2020:  Right UOQ anterior distortion.\par \par Bilateral breast ultrasound 10/7/2020:  Right 11:00 N1 irregular hypoechoic nodular tissue with increased vascularity.  Bilateral stable hypoechoic nodules.\par \par Right breast US guided core biopsy 10/14/2020 11:00- venus clip\par Pathology= mod differentiated invasive ductal carcinoma SBR 6/9 1.1 cm, ki 67 15-20%  ER >90% [place]>90% Her 2 neg\par \par Surgical pathology 11/30/2020:  Left mastectomy= benign, SLN neg\par                                                    Right mastectomy 1.5 cm invasive cancer, no LVI, SLN x2 and non SLN neg.

## 2021-03-30 NOTE — HISTORY OF PRESENT ILLNESS
[FreeTextEntry1] : This is a 50 year old  female who was found to have an abnormality on her routine right mammogram and ultrasound .  An ultrasound guided core biopsy showed a moderately differentiated invasive ductal carcinoma, ER +MI+Her 2 neg tumor. Oncotype scar is 16.\par \par She has had previous needle biopsies and cyst aspirations of her breasts.  \par \par She opted to undergo bilateral mastectomies with  EJ reconstruction on 11/30/2020. Pathology showed a 1.5 cm invasive cancer, SLN x2 neg. Oncotype score 16. She had a significant wound breakdown on the right that is now nearly healed. She is now just using bacitracin.\par \par She met with Dr. Eugene and Tamoxifen was advised.

## 2021-03-30 NOTE — PHYSICAL EXAM
[No Cervical Adenopathy] : no cervical adenopathy [Normal S1, S2] : normal S1 and S2 [Bra Size: ___] : Bra Size: [unfilled] [Grade 3] : Ptosis Grade 3 [de-identified] : Circular skin island with reduction pattern incision.  Stellate scar with 1 cm flat open surface- redressed.   [de-identified] : Circular skin island with reduction pattern incisions fully healed. 4 cm area of fat necrosis 6:00, 2 cm area medial lower and oval zone of thickening UIQ. [de-identified] : Low transverse incision healing well.

## 2021-04-01 ENCOUNTER — APPOINTMENT (OUTPATIENT)
Age: 51
End: 2021-04-01
Payer: COMMERCIAL

## 2021-04-01 ENCOUNTER — RESULT REVIEW (OUTPATIENT)
Age: 51
End: 2021-04-01

## 2021-04-01 VITALS
WEIGHT: 262 LBS | BODY MASS INDEX: 43.65 KG/M2 | TEMPERATURE: 97.8 F | HEART RATE: 76 BPM | SYSTOLIC BLOOD PRESSURE: 128 MMHG | DIASTOLIC BLOOD PRESSURE: 81 MMHG | RESPIRATION RATE: 16 BRPM | HEIGHT: 65 IN

## 2021-04-01 LAB
24R-OH-CALCIDIOL SERPL-MCNC: 30.6 NG/ML — SIGNIFICANT CHANGE UP (ref 30–80)
ALBUMIN SERPL ELPH-MCNC: 4.2 G/DL — SIGNIFICANT CHANGE UP (ref 3.3–5)
ALP SERPL-CCNC: 106 U/L — SIGNIFICANT CHANGE UP (ref 40–120)
ALT FLD-CCNC: 14 U/L — SIGNIFICANT CHANGE UP (ref 10–45)
ANION GAP SERPL CALC-SCNC: 10 MMOL/L — SIGNIFICANT CHANGE UP (ref 5–17)
AST SERPL-CCNC: 17 U/L — SIGNIFICANT CHANGE UP (ref 10–40)
BASOPHILS # BLD AUTO: 0.08 K/UL — SIGNIFICANT CHANGE UP (ref 0–0.2)
BASOPHILS NFR BLD AUTO: 1.3 % — SIGNIFICANT CHANGE UP (ref 0–2)
BILIRUB SERPL-MCNC: 0.2 MG/DL — SIGNIFICANT CHANGE UP (ref 0.2–1.2)
BUN SERPL-MCNC: 16 MG/DL — SIGNIFICANT CHANGE UP (ref 7–23)
CALCIUM SERPL-MCNC: 9.1 MG/DL — SIGNIFICANT CHANGE UP (ref 8.4–10.5)
CHLORIDE SERPL-SCNC: 108 MMOL/L — SIGNIFICANT CHANGE UP (ref 96–108)
CO2 SERPL-SCNC: 25 MMOL/L — SIGNIFICANT CHANGE UP (ref 22–31)
CREAT SERPL-MCNC: 0.69 MG/DL — SIGNIFICANT CHANGE UP (ref 0.5–1.3)
EOSINOPHIL # BLD AUTO: 0.22 K/UL — SIGNIFICANT CHANGE UP (ref 0–0.5)
EOSINOPHIL NFR BLD AUTO: 3.6 % — SIGNIFICANT CHANGE UP (ref 0–6)
GLUCOSE SERPL-MCNC: 91 MG/DL — SIGNIFICANT CHANGE UP (ref 70–99)
HCT VFR BLD CALC: 39.9 % — SIGNIFICANT CHANGE UP (ref 34.5–45)
HGB BLD-MCNC: 12.7 G/DL — SIGNIFICANT CHANGE UP (ref 11.5–15.5)
IMM GRANULOCYTES NFR BLD AUTO: 0.3 % — SIGNIFICANT CHANGE UP (ref 0–1.5)
LYMPHOCYTES # BLD AUTO: 1.94 K/UL — SIGNIFICANT CHANGE UP (ref 1–3.3)
LYMPHOCYTES # BLD AUTO: 31.9 % — SIGNIFICANT CHANGE UP (ref 13–44)
MCHC RBC-ENTMCNC: 24.4 PG — LOW (ref 27–34)
MCHC RBC-ENTMCNC: 31.8 GM/DL — LOW (ref 32–36)
MCV RBC AUTO: 76.7 FL — LOW (ref 80–100)
MONOCYTES # BLD AUTO: 0.37 K/UL — SIGNIFICANT CHANGE UP (ref 0–0.9)
MONOCYTES NFR BLD AUTO: 6.1 % — SIGNIFICANT CHANGE UP (ref 2–14)
NEUTROPHILS # BLD AUTO: 3.45 K/UL — SIGNIFICANT CHANGE UP (ref 1.8–7.4)
NEUTROPHILS NFR BLD AUTO: 56.8 % — SIGNIFICANT CHANGE UP (ref 43–77)
NRBC # BLD: 0 /100 WBCS — SIGNIFICANT CHANGE UP (ref 0–0)
PLATELET # BLD AUTO: 300 K/UL — SIGNIFICANT CHANGE UP (ref 150–400)
POTASSIUM SERPL-MCNC: 4.3 MMOL/L — SIGNIFICANT CHANGE UP (ref 3.5–5.3)
POTASSIUM SERPL-SCNC: 4.3 MMOL/L — SIGNIFICANT CHANGE UP (ref 3.5–5.3)
PROT SERPL-MCNC: 7 G/DL — SIGNIFICANT CHANGE UP (ref 6–8.3)
RBC # BLD: 5.2 M/UL — SIGNIFICANT CHANGE UP (ref 3.8–5.2)
RBC # FLD: 15.2 % — HIGH (ref 10.3–14.5)
SODIUM SERPL-SCNC: 143 MMOL/L — SIGNIFICANT CHANGE UP (ref 135–145)
WBC # BLD: 6.08 K/UL — SIGNIFICANT CHANGE UP (ref 3.8–10.5)
WBC # FLD AUTO: 6.08 K/UL — SIGNIFICANT CHANGE UP (ref 3.8–10.5)

## 2021-04-01 PROCEDURE — 99214 OFFICE O/P EST MOD 30 MIN: CPT

## 2021-04-01 RX ORDER — CLOBETASOL PROPIONATE 0.5 MG/G
0.05 OINTMENT TOPICAL
Qty: 30 | Refills: 0 | Status: DISCONTINUED | COMMUNITY
Start: 2019-11-06 | End: 2021-04-01

## 2021-04-01 NOTE — ASSESSMENT
[FreeTextEntry1] : Ms. CRYSTAL 's questions were answered to her satisfaction. She  expressed her  understanding and willingness to comply with the above recommendations, and  will return to the office in 3 months.\par \par \par \par \par \par \par

## 2021-04-01 NOTE — REVIEW OF SYSTEMS
[Patient Intake Form Reviewed] : Patient intake form was reviewed [Skin Wound] : skin wound [Negative] : Psychiatric [Fever] : no fever [Night Sweats] : no night sweats [Fatigue] : no fatigue [de-identified] : right breast postoperative eschar removed; now with good local granulation tissue

## 2021-04-01 NOTE — HISTORY OF PRESENT ILLNESS
[Disease: _____________________] : Disease: [unfilled] [T: ___] : T[unfilled] [N: ___] : N[unfilled] [M: ___] : M[unfilled] [AJCC Stage: ____] : AJCC Stage: [unfilled] [de-identified] : 50F, premenopausal, , with past medical history of fibrocystic breasts, adenomatous nodular goiter, hypothyroidism, and with right breast moderately differentiated infiltrating ductal carcinoma, ER/SD+, Her 2 negative diagnosed  in 2020.\par \par CASE SYNOPSIS:\par 10/7/20- mammogram ([facility] for [facility]): Bilateral scattered patchy areas of asymmetric breast tissue.  In the upper outer quadrant of the right breast anteriorly, distortion is noted.  Spot compression views and a true lateral view obtained with tomosynthesis.  Persistent asymmetric density and distortion is present.  Ultrasound performed for further evaluation\par 10/7/20- breast US: indeterminate asymmetry with distortion at 11:00 in the right breast anteriorly.  Ultrasound-guided core biopsy is recommended.\par 10/16/20 - US guided core biopsy R breast; pathology consistent with infiltrating ductal carcinoma, moderately differentiated, largest contiguous focus is at least 1.2 cm, no definitive LVI, no in situ component identified.\par 10/26/20 - breast MRI: 1.4 cm mass in the upper outer right breast, corresponding the newly diagnosed malignancy.  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  Margins negative, no LCIS or DCIS, 2 SLN + 3 LN not involved by tumor cells, stage I (pT1b, pN0), ER> 90%,SD>90%, Her 2 negative.\par Left breast–ductal hyperplasia, nonatypical, sclerosing adenosis, cyst formation, and microcalcification.  Negative for malignancy.1SLN negative for malignancy.\par \par \par  [de-identified] : infiltrating ductal carcinoma [de-identified] : ER> 90%,[place]>90%, Her 2 negative. [FreeTextEntry1] : plan adjuvant HRT [de-identified] : Since her last visit 2-month ago, Ms. CRYSTAL continued follow-up with Dr. Noe (plastic surgery for debridement of right breast wound.  She has not started adjuvant hormonal therapy with tamoxifen yet, and is here to be reevaluated for medical treatment.  She is complying with wound dressing and was last seen in Dr. Noe office on 3/9/2021; her right mastectomy flap wound was clean with healthy granulation tissue and diminished in size.  She was instructed about silicone sheeting for a better, faster healing.  Overall she is doing well, remains active.  Laboratory tests consistent with normal CBC.

## 2021-04-01 NOTE — PHYSICAL EXAM
[Fully active, able to carry on all pre-disease performance without restriction] : Status 0 - Fully active, able to carry on all pre-disease performance without restriction [Normal] : normal appearance, no rash, nodules, vesicles, ulcers, erythema [de-identified] : b/l EJ reconstruction - right breast ulceration almost healed

## 2021-04-02 DIAGNOSIS — C50.411 MALIGNANT NEOPLASM OF UPPER-OUTER QUADRANT OF RIGHT FEMALE BREAST: ICD-10-CM

## 2021-04-02 LAB — CANCER AG27-29 SERPL-ACNC: 12.4 U/ML — SIGNIFICANT CHANGE UP (ref 0–38.6)

## 2021-04-19 ENCOUNTER — APPOINTMENT (OUTPATIENT)
Dept: PHYSICAL MEDICINE AND REHAB | Facility: CLINIC | Age: 51
End: 2021-04-19
Payer: COMMERCIAL

## 2021-04-19 VITALS
OXYGEN SATURATION: 100 % | WEIGHT: 262 LBS | RESPIRATION RATE: 16 BRPM | SYSTOLIC BLOOD PRESSURE: 118 MMHG | HEIGHT: 65 IN | HEART RATE: 84 BPM | BODY MASS INDEX: 43.65 KG/M2 | DIASTOLIC BLOOD PRESSURE: 80 MMHG

## 2021-04-19 PROCEDURE — 99072 ADDL SUPL MATRL&STAF TM PHE: CPT

## 2021-04-19 PROCEDURE — 99205 OFFICE O/P NEW HI 60 MIN: CPT | Mod: GC

## 2021-04-20 ENCOUNTER — TRANSCRIPTION ENCOUNTER (OUTPATIENT)
Age: 51
End: 2021-04-20

## 2021-04-21 NOTE — PHYSICAL EXAM
[FreeTextEntry1] : Constitutional: Well-nourished, well-developed, No acute distress, alert and oriented x 3\par Respiratory: Good respiratory effort, no SOB\par Chest wall: Both breasts with pink, healing incisions along inferior aspect and at site of nipple excisions with fullness at right lower quadrant of right breast. \par Lymphatic: No regional lymphadenopathy, no lymphedema.\par Psychiatric: Pleasant and normal affect\par Skin: Clean dry and intact \par Musculoskeletal: normal except where as noted in regional exam\par Extremities: Bilateral upper extremities appear symmetrical. Wrists are 7 inches in circumference, elbows are 12.5 inches in circumference. No axillary webbing.\par \par \par RIGHT Shoulder:\par APPEARANCE: no marked deformities, no swelling or malalignment\par POSITIVE TENDERNESS: deltoid insertion. Non tender over supraspinatus, infraspinatus, teres minor, LH biceps.\par ROM: full active and passive ROM in all directions. Painful arc at 100 degrees of abduction, pain at deltoid insertion at end of ROM of external rotation.\par Vasc: 2+ radial pulse\par Neuro: AIN, PIN, Ulnar nerve intact to motor, DTRs 2+/4 biceps, triceps, brachioradialis\par Sensation: Intact to light touch throughout\par B/L Elbows: No asymmetry, malalignment, or swelling, Full ROM, 5/5 strength in flexion/ext, pronation/supination, Joints stable\par B/L Wrist and Hand: No asymmetry, malalignment, or swelling, Full ROM, 5/5 strength in wrist and long finger flexion/ext, radial/ulnar deviation, Joints stable\par Special tests: neg Brink, neg empty can, neg drop arm. \par \par Sozo - -2.1 left arm, -1.6 right arm

## 2021-04-21 NOTE — REVIEW OF SYSTEMS
[Negative] : Heme/Lymph [Joint Stiffness] : no joint stiffness [Muscle Weakness] : no muscle weakness [FreeTextEntry9] : +right shoulder pain [de-identified] : +bilateral breast incisions [de-identified] : N [de-identified] : No arm heaviness or swelling

## 2021-04-21 NOTE — HISTORY OF PRESENT ILLNESS
[FreeTextEntry1] : Isidra Elise is a 49yo Female with hx of double mastectomy and deep flap reconstruction in Nov 2020 with subsequent debridement of right breast wound in Jan 2021 who presents for initial evaluation of right arm pain. Pain started about on month ago without any inciting event. Pain is 5/10 wakes her up from sleep, and is worse in the morning. It is aching, reporducible with right shoulder abduction, and tender to palpation along the posterior aspect of her upper arm. She denies breast pain, tingling pain. 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 She does not exercise. Sleep and mood are both good.\par \par \par \par

## 2021-04-21 NOTE — ASSESSMENT
[FreeTextEntry1] : Ms. Elise is a pleasant 51yo Female with pmhx of right breast moderately differentiated infiltrating ductal carcinoma s/p double mastectomy and deep flap reconstruction with subsequent debridement of right breast wound, now closed who presents for initial evaluation of right arm pain.\par \par Assessment:\par Right rotator cuff tendonitis\par \par Plan:\par -Motrin PRN for shoulder pain\par -Start 6-week course of physical therapy for right shoulder pain with therapist specialized in post mastectomy treatment\par -Walking program to promote lymphatic drainage and overall health and well being.\par - Follow up in 3 months for repeated Sozo assessment to reevaluate risk for post-mastectomy lymphadema.(currently not at risk for lymphadema) or sooner if right shoulder pain does not improve with OTC medication and physical therapy.\par

## 2021-04-28 ENCOUNTER — NON-APPOINTMENT (OUTPATIENT)
Age: 51
End: 2021-04-28

## 2021-05-19 ENCOUNTER — APPOINTMENT (OUTPATIENT)
Dept: ENDOCRINOLOGY | Facility: CLINIC | Age: 51
End: 2021-05-19
Payer: COMMERCIAL

## 2021-05-19 VITALS
BODY MASS INDEX: 43.32 KG/M2 | OXYGEN SATURATION: 98 % | SYSTOLIC BLOOD PRESSURE: 128 MMHG | TEMPERATURE: 97.8 F | HEART RATE: 83 BPM | HEIGHT: 65 IN | WEIGHT: 260 LBS | DIASTOLIC BLOOD PRESSURE: 74 MMHG

## 2021-05-19 PROCEDURE — 99072 ADDL SUPL MATRL&STAF TM PHE: CPT

## 2021-05-19 PROCEDURE — 76536 US EXAM OF HEAD AND NECK: CPT | Mod: 52

## 2021-05-19 PROCEDURE — 99214 OFFICE O/P EST MOD 30 MIN: CPT | Mod: 25

## 2021-05-19 NOTE — HISTORY OF PRESENT ILLNESS
[FreeTextEntry1] : 50 year old female here for evaluation of hypothyroidism \par Recently diagnosed with Stage I ( T1, N0, Mx), ER+, Her 2 negative, right breast infiltrating ductal carcinoma, s/p\par     bilateral mastectomy with EJ reconstructive surgery.\par \par \par Currently on levothyroxine 75 mcg daily \par Symptoms: \par No Constipation \par +Periods are becoming irregular from April 2020 \par No cold intolerance \par Increased hair loss \par Lost edge of the eyebrows ( couple of years) \par Lost 10 lbs \par \par \par 08/14/2020:\par TSH of 7.82 and Free T4; 0.79 \par Most recent TSH of 2.5 on LT4 on 75 mcg \par \par Last visit found to have a right sided nodule of 1.62 x 0.82 x 0.66 cm \par Left sided cyst : 0.58 x 0.34 x 0.44 cm \par

## 2021-05-19 NOTE — ASSESSMENT
[FreeTextEntry1] : 50 year old female here for follow-up of hypothyroidism and thyroid nodules. \par Clinically hypothyroid and found to have a TSH of 7.8, now euthyroid on levothyroxine. \par Also noted on thyroid ultrasound today to have a stable right and left sided thyroid nodule ( Right sided nodule was 1.62 cm and mixed) ( Previous FNA was benign) and left sided was spongiform nodule at 0.88 cm. \par \par \par -Continue Levothyroxine 75 mcg daily \par -Check TFTs \par -Stable bilateral thyroid nodules ( Right sided heterogenous nodule was 1.62 x 0.82 x 0.66 cm) and left sided nodule was 0.58 x 0.34 x 0.44 cm cyst \par -FNA in 2020 of right sided nodule was benign \par \par -Follow up in 6 months

## 2021-05-19 NOTE — REVIEW OF SYSTEMS
[Fatigue] : no fatigue [Decreased Appetite] : appetite not decreased [Recent Weight Gain (___ Lbs)] : no recent weight gain [Visual Field Defect] : no visual field defect [Recent Weight Loss (___ Lbs)] : no recent weight loss [Dry Eyes] : no dryness [Dysphagia] : no dysphagia [Neck Pain] : no neck pain [Dysphonia] : no dysphonia [Chest Pain] : no chest pain [Nasal Congestion] : no nasal congestion [Slow Heart Rate] : heart rate is not slow [Palpitations] : no palpitations [Fast Heart Rate] : heart rate is not fast [Shortness Of Breath] : no shortness of breath [Cough] : no cough [Nausea] : no nausea [Constipation] : no constipation [Vomiting] : no vomiting [Diarrhea] : no diarrhea [Polyuria] : no polyuria [Irregular Menses] : regular menses [Joint Pain] : no joint pain [Muscle Weakness] : no muscle weakness [Acanthosis] : no acanthosis  [Acne] : no acne [Headaches] : no headaches [Dizziness] : no dizziness [Tremors] : no tremors [Pain/Numbness of Digits] : no pain/numbness of digits [Depression] : no depression [Polydipsia] : no polydipsia [Cold Intolerance] : no cold intolerance [Easy Bleeding] : no ~M tendency for easy bleeding [Easy Bruising] : no tendency for easy bruising

## 2021-05-21 LAB
T4 FREE SERPL-MCNC: 1.4 NG/DL
TSH SERPL-ACNC: 1.26 UIU/ML

## 2021-06-15 ENCOUNTER — APPOINTMENT (OUTPATIENT)
Dept: BREAST CENTER | Facility: CLINIC | Age: 51
End: 2021-06-15
Payer: COMMERCIAL

## 2021-06-15 VITALS
HEART RATE: 84 BPM | SYSTOLIC BLOOD PRESSURE: 122 MMHG | DIASTOLIC BLOOD PRESSURE: 84 MMHG | WEIGHT: 260 LBS | BODY MASS INDEX: 43.32 KG/M2 | HEIGHT: 65 IN

## 2021-06-15 PROCEDURE — 99072 ADDL SUPL MATRL&STAF TM PHE: CPT

## 2021-06-15 PROCEDURE — 99213 OFFICE O/P EST LOW 20 MIN: CPT

## 2021-06-15 NOTE — PHYSICAL EXAM
[No Cervical Adenopathy] : no cervical adenopathy [Normal S1, S2] : normal S1 and S2 [Sclera nonicteric] : sclera nonicteric [EOMI] : extra ocular movement intact [Supple] : supple [No Supraclavicular Adenopathy] : no supraclavicular adenopathy [Clear to Auscultation Bilat] : clear to auscultation bilaterally [Examined in the supine and seated position] : examined in the supine and seated position [Asymmetrical] : asymmetrical [No Axillary Lymphadenopathy] : no left axillary lymphadenopathy [de-identified] : Circular skin island with reduction pattern incision.  Stellate scar. [de-identified] : Circular skin island with reduction pattern incisions fully healed. 4 cm area of fat necrosis 6:00, 1.5 cm thickening medial near site of anastomosis. [de-identified] : Low transverse incision healing well.

## 2021-06-15 NOTE — DATA REVIEWED
[FreeTextEntry1] : Bilateral mammogram 10/7/2020:  Right UOQ anterior distortion.\par \par Bilateral breast ultrasound 10/7/2020:  Right 11:00 N1 irregular hypoechoic nodular tissue with increased vascularity.  Bilateral stable hypoechoic nodules.\par \par Right breast US guided core biopsy 10/14/2020 11:00- venus clip\par Pathology= mod differentiated invasive ductal carcinoma SBR 6/9 1.1 cm, ki 67 15-20%  ER >90% [place]>90% Her 2 neg\par \par Surgical pathology 11/30/2020:  Left mastectomy= benign, SLN neg\par                                                    Right mastectomy 1.5 cm invasive cancer, no LVI, SLN x2 and non SLN neg.

## 2021-07-15 ENCOUNTER — APPOINTMENT (OUTPATIENT)
Dept: PHYSICAL MEDICINE AND REHAB | Facility: CLINIC | Age: 51
End: 2021-07-15
Payer: COMMERCIAL

## 2021-07-15 VITALS
OXYGEN SATURATION: 98 % | HEART RATE: 80 BPM | TEMPERATURE: 98.8 F | SYSTOLIC BLOOD PRESSURE: 123 MMHG | DIASTOLIC BLOOD PRESSURE: 80 MMHG

## 2021-07-15 DIAGNOSIS — M75.41 IMPINGEMENT SYNDROME OF RIGHT SHOULDER: ICD-10-CM

## 2021-07-15 PROCEDURE — 99212 OFFICE O/P EST SF 10 MIN: CPT

## 2021-07-15 PROCEDURE — 99072 ADDL SUPL MATRL&STAF TM PHE: CPT

## 2021-07-19 ENCOUNTER — APPOINTMENT (OUTPATIENT)
Dept: PHYSICAL MEDICINE AND REHAB | Facility: CLINIC | Age: 51
End: 2021-07-19
Payer: COMMERCIAL

## 2021-07-19 DIAGNOSIS — M75.01 ADHESIVE CAPSULITIS OF RIGHT SHOULDER: ICD-10-CM

## 2021-07-19 PROBLEM — M75.41 ROTATOR CUFF IMPINGEMENT SYNDROME OF RIGHT SHOULDER: Status: ACTIVE | Noted: 2021-04-19

## 2021-07-19 PROCEDURE — 99072 ADDL SUPL MATRL&STAF TM PHE: CPT

## 2021-07-19 PROCEDURE — 20611 DRAIN/INJ JOINT/BURSA W/US: CPT | Mod: RT

## 2021-07-19 PROCEDURE — 99214 OFFICE O/P EST MOD 30 MIN: CPT | Mod: 25

## 2021-07-19 NOTE — PROCEDURE
[de-identified] : SHOULDER INJECTION\par Procedure: Ultrasound Guided Intra-articular Steroid Injection of Shoulder:\par \par Shoulder- RIGHT \par -Potential benefits and side effects of the procedure were explained to the patient and an opportunity for questions was provided. In addition to typical procedure related side effects, specific possible side effects from the procedure were explained including injury to the nerves, vessels/branches, and skin depigmentation/ subcutaneous atrophy from corticosteroid.\par \par -Patient positioning: lateral recumbent\par \par -Skin Preparation: the overlying skin was prepped with Chloro-prep swab sticks which was allowed to dry for at least 30 seconds.\par -Visualization of the shoulder joint anatomy was performed with a high frequency ultrasound probe sterilized with PDI wipe prior to application of sterile ultrasound gel.\par -Under direct ultrasound, the humeral head and labrum were visualized in long and short axis.\par -The skin at the needle entry point was anesthetized with 1 mL of 0.5% lidocaine using a 27 gauge 1.5 inch needle.\par -From a posterior approach, a 25 gauge 3.5 inch needle was then advanced into the joint space using the following solution:\par -2 ml volume (1.5 mL of 0.5% lidocaine and 0.5 ml of kenalog 40)\par -Total volume injected in subacromial bursa: 3.5 ml\par -Needle position was monitored using both long-axis and short-axis visualization and adjusted as necessary and aspiration prior to injections were negative for blood return.\par -A Band-Aid was then placed over the needle entry site.\par \par Procedure summary:\par -Patient tolerance: Excellent\par -Miscellaneous technical comments: none\par \par Recommendations:\par -Ice the area for 20-30 minutes up to [id number] prn until being seen for follow-up\par -Limit use of the affected region.\par -continue with other aspects of treatment plan as described in prior office note.\par -Contact me with any questions/concerns.\par

## 2021-07-19 NOTE — HISTORY OF PRESENT ILLNESS
[FreeTextEntry1] : 51 yo F with PMH breast CA s/p mastectomy who presents with right shoulder pain and stiffness.\par \par Onset: 5 months following breast surgery.  No inciting events, trauma, or falls.\par Location: right shoulder\par Characteristics: dull ache with periods of sharp pain\par Aggravating factors: overhead activities\par Alleviating factors: rest\par Radiation: denies\par Treatments: tylenol, NSAIDs, rest, physical therapy, HEP with minimal relief of her pain\par Severity: 7-9/10\par \par Diagnostic studies:\par No recent imaging.\par No EMG/NCS\par \par Patient denies new weakness, numbness or paresthesia.  Patient denies bowel/bladder dysfunction, fevers, chills, weight loss, night pain, or night sweats.\par

## 2021-07-19 NOTE — PHYSICAL EXAM
[FreeTextEntry1] : Constitutional: NAD, sitting comfortably\par HEENT: NCAT, EOMI\par CV: Appears well perfused\par Pulm: Breathing comfortably on room air\par Abd: Soft, NTND\par MSK: Active ROM of R shoulder limited to 170 degrees. Remainder of ROM intact, however  pain is reproducible with internal and external rotation of shoulder. Strength 5/5 in bilateral upper extremities. (+) Brink test. (-) empty can test. Neck and shoulder nonTTP\par Neuro: Sensation intact to light touch

## 2021-07-19 NOTE — ASSESSMENT
[FreeTextEntry1] : 49 yo F who presents with right shoulder pain and stiffness consistent with subacromial bursitis, RTC impingement and adhesive capsulitis.\par \par -Previous PM&R notes reviewed\par -Most recent endocrinology and Hem-Onc notes reviewed\par -US guided right GHJ injection performed this PM with significant improvement in her symptoms.\par -Ice and tylenol prn pain.\par -Cont. PT/HEP\par -RTC 4-6 weeks, If pain persists or worsen despite compliance with above, then will consider MRI right shoulder w/o contrast at next visit.\par \par Dmitry Bae MD\par Spine and Sports Medicine\par \par [employer]\par At [facility]\par \par \par

## 2021-07-19 NOTE — HISTORY OF PRESENT ILLNESS
[FreeTextEntry1] : Isidra Elise is a 49yo Female with hx of double mastectomy and deep flap reconstruction in Nov 2020 with subsequent debridement of right breast wound in Jan 2021 who presents for follow up evaluation of right arm pain. Since her last visit patient notes worsening R arm pain. Pain started about on 5 months ago without any inciting event. Pain is currently 3/10, but can be 10/10 at times. She completed a 6 weeks of PT which she states worsened the pain. Pain is described as a dull achiness. Worse with rest, improves with movement of arm. She notes radiation of the pain down the lateral aspect of her mid arm. Denies any neck or shoulder pain. She takes motrin 600mg daily, which provides little relief. She was recently started on tamoxifen on 5/1.

## 2021-07-19 NOTE — REVIEW OF SYSTEMS
[Muscle Pain] : muscle pain [Fever] : no fever [Chest Pain] : no chest pain [Palpitations] : no palpitations [Shortness Of Breath] : no shortness of breath [Cough] : no cough [Abdominal Pain] : no abdominal pain [Dysuria] : no dysuria [Joint Pain] : no joint pain [Skin Rash] : no skin rash [Headache] : no headaches [FreeTextEntry9] : R arm pain

## 2021-07-19 NOTE — PHYSICAL EXAM
[FreeTextEntry1] : Gen: NAD\par Neck: FAROM, neg spurling, non-tender to palpation\par Right shoulder: range of motion limited in all planes secondary to pain, pos neer's, pos hawkin's, pos speed's, neg apprehension, neg cross arm, neg empty can \par CV: no cyanosis\par Pulm: breathing well on room air\par Abd: soft\par Msk: \par 5/5 hip flexion B/L, 5/5 knee extension B/L, 5/5 knee flexion B/L, 5/5 dorsiflexion B/L, 5/5 EHL B/L, 5/5 plantar flexion B/L\par 5/5 shoulder abduction B/L, 5/5 elbow flexion B/L, 5/5 elbow extension B/L, 5/5 wrist extension B/L, 5/5 hand  B/L\par Neuro: sensation intact to light touch in bilateral upper and lower extremities, reflexes 2+ brachioradialis, biceps, triceps bilaterally, reflexes 2+ patella, medial hamstring, achilles bilaterally, negative babinski, negative kaye\par

## 2021-07-19 NOTE — ASSESSMENT
[FreeTextEntry1] : Isidra Elise is a 49yo Female with hx of double mastectomy and deep flap reconstruction in Nov 2020 with subsequent debridement of right breast wound in Jan 2021 who presents for follow up evaluation of right arm pain\par \par -Likely rotator cuff tendinopathy\par -Start meloxicam PRN, rx sent. Discussed side effect profile\par -Schedule for US guided steroid injection of R rotator cuff\par -Start breast CA physical therapy\par -Follow up 1 month post physical therapy\par

## 2021-07-29 ENCOUNTER — TRANSCRIPTION ENCOUNTER (OUTPATIENT)
Age: 51
End: 2021-07-29

## 2021-08-16 ENCOUNTER — TRANSCRIPTION ENCOUNTER (OUTPATIENT)
Age: 51
End: 2021-08-16

## 2021-08-24 ENCOUNTER — RX RENEWAL (OUTPATIENT)
Age: 51
End: 2021-08-24

## 2021-09-10 ENCOUNTER — APPOINTMENT (OUTPATIENT)
Dept: PLASTIC SURGERY | Facility: CLINIC | Age: 51
End: 2021-09-10
Payer: COMMERCIAL

## 2021-09-10 VITALS
SYSTOLIC BLOOD PRESSURE: 133 MMHG | OXYGEN SATURATION: 99 % | BODY MASS INDEX: 43.99 KG/M2 | WEIGHT: 264 LBS | TEMPERATURE: 97.4 F | DIASTOLIC BLOOD PRESSURE: 84 MMHG | HEART RATE: 78 BPM | HEIGHT: 65 IN

## 2021-09-10 DIAGNOSIS — Z85.3 PERSONAL HISTORY OF MALIGNANT NEOPLASM OF BREAST: ICD-10-CM

## 2021-09-10 PROCEDURE — 99215 OFFICE O/P EST HI 40 MIN: CPT

## 2021-09-10 NOTE — PHYSICAL EXAM
[de-identified] : alert, calm, cooperative\par  [de-identified] : respirations are even and unlabored\par  [de-identified] : Bilateral breast incisions are well-healed.  Breast asymmetry noted with right inferior breast soft tissue deficiency and prominent scarring.  Mild lateral and inferior edema.  No wound dehiscence or fluctuance [de-identified] : abdomen soft, nt. incisions well healed. no abdominal bulge or weakness. Mild bilateral flank dog ears. fullness in mons. widend abdominal scar.

## 2021-09-10 NOTE — HISTORY OF PRESENT ILLNESS
[FreeTextEntry1] : Isidra is a 50 y.o. F history of hypothyroidism and right breast invasive CA s/p bilateral mastectomies with EJ reconstruction on 11/30/2020 presents today for routine follow up. Patient reports complete healing of right breast wounds. She reports doing well.  Reports some intra abdominal tightness she experiences intermittently She would like to discuss revision surgery.

## 2021-10-20 ENCOUNTER — APPOINTMENT (OUTPATIENT)
Dept: BREAST CENTER | Facility: CLINIC | Age: 51
End: 2021-10-20
Payer: COMMERCIAL

## 2021-10-20 VITALS
WEIGHT: 270 LBS | DIASTOLIC BLOOD PRESSURE: 84 MMHG | HEART RATE: 78 BPM | SYSTOLIC BLOOD PRESSURE: 133 MMHG | BODY MASS INDEX: 44.98 KG/M2 | HEIGHT: 65 IN

## 2021-10-20 DIAGNOSIS — Z90.13 ACQUIRED ABSENCE OF BILATERAL BREASTS AND NIPPLES: ICD-10-CM

## 2021-10-20 PROCEDURE — 99213 OFFICE O/P EST LOW 20 MIN: CPT

## 2021-10-20 NOTE — PHYSICAL EXAM
[EOMI] : extra ocular movement intact [Sclera nonicteric] : sclera nonicteric [Supple] : supple [No Supraclavicular Adenopathy] : no supraclavicular adenopathy [No Cervical Adenopathy] : no cervical adenopathy [Clear to Auscultation Bilat] : clear to auscultation bilaterally [Normal S1, S2] : normal S1 and S2 [Examined in the supine and seated position] : examined in the supine and seated position [Asymmetrical] : asymmetrical [No Axillary Lymphadenopathy] : no left axillary lymphadenopathy [No dominant masses] : no dominant masses in right breast  [No dominant masses] : no dominant masses left breast [de-identified] : Circular skin island with reduction pattern incision.  Stellate scar. [de-identified] : Circular skin island with reduction pattern incisions. [de-identified] : Low transverse scar.

## 2021-10-20 NOTE — HISTORY OF PRESENT ILLNESS
[FreeTextEntry1] : This is a 50 year old  female who was found to have an abnormality on her routine right mammogram and ultrasound .  An ultrasound guided core biopsy showed a moderately differentiated invasive ductal carcinoma, ER +MT+Her 2 neg tumor. Oncotype score is 16.\par \par She has had previous needle biopsies and cyst aspirations of her breasts.  \par \par She opted to undergo bilateral mastectomies with  EJ reconstruction on 11/30/2020. Pathology showed a 1.5 cm invasive cancer, SLN x2 neg. Oncotype score 16. She had a significant wound breakdown which is now fully healed. A revision will be planned in the spring.\par \par She is on Tamoxifen and tolerating it well (Dr. Eugene).

## 2021-10-21 ENCOUNTER — OUTPATIENT (OUTPATIENT)
Dept: OUTPATIENT SERVICES | Facility: HOSPITAL | Age: 51
LOS: 1 days | Discharge: HOME | End: 2021-10-21

## 2021-10-21 DIAGNOSIS — C50.919 MALIGNANT NEOPLASM OF UNSPECIFIED SITE OF UNSPECIFIED FEMALE BREAST: ICD-10-CM

## 2021-10-21 DIAGNOSIS — Z83.79 FAMILY HISTORY OF OTHER DISEASES OF THE DIGESTIVE SYSTEM: Chronic | ICD-10-CM

## 2021-10-22 ENCOUNTER — RESULT REVIEW (OUTPATIENT)
Age: 51
End: 2021-10-22

## 2021-10-22 ENCOUNTER — APPOINTMENT (OUTPATIENT)
Age: 51
End: 2021-10-22
Payer: COMMERCIAL

## 2021-10-22 VITALS
BODY MASS INDEX: 45.49 KG/M2 | HEART RATE: 96 BPM | RESPIRATION RATE: 16 BRPM | WEIGHT: 273.37 LBS | DIASTOLIC BLOOD PRESSURE: 80 MMHG | SYSTOLIC BLOOD PRESSURE: 117 MMHG | OXYGEN SATURATION: 97 % | TEMPERATURE: 97.2 F

## 2021-10-22 LAB
BASOPHILS # BLD AUTO: 0.1 K/UL — SIGNIFICANT CHANGE UP (ref 0–0.2)
BASOPHILS NFR BLD AUTO: 0.9 % — SIGNIFICANT CHANGE UP (ref 0–2)
EOSINOPHIL # BLD AUTO: 0.2 K/UL — SIGNIFICANT CHANGE UP (ref 0–0.5)
EOSINOPHIL NFR BLD AUTO: 1.9 % — SIGNIFICANT CHANGE UP (ref 0–6)
HCT VFR BLD CALC: 43.1 % — SIGNIFICANT CHANGE UP (ref 34.5–45)
HGB BLD-MCNC: 14.6 G/DL — SIGNIFICANT CHANGE UP (ref 11.5–15.5)
IMM GRANULOCYTES NFR BLD AUTO: 0.4 % — SIGNIFICANT CHANGE UP (ref 0–1.5)
LYMPHOCYTES # BLD AUTO: 2.51 K/UL — SIGNIFICANT CHANGE UP (ref 1–3.3)
LYMPHOCYTES # BLD AUTO: 23.5 % — SIGNIFICANT CHANGE UP (ref 13–44)
MCHC RBC-ENTMCNC: 29.3 PG — SIGNIFICANT CHANGE UP (ref 27–34)
MCHC RBC-ENTMCNC: 33.9 G/DL — SIGNIFICANT CHANGE UP (ref 32–36)
MCV RBC AUTO: 86.4 FL — SIGNIFICANT CHANGE UP (ref 80–100)
MONOCYTES # BLD AUTO: 0.64 K/UL — SIGNIFICANT CHANGE UP (ref 0–0.9)
MONOCYTES NFR BLD AUTO: 6 % — SIGNIFICANT CHANGE UP (ref 2–14)
NEUTROPHILS # BLD AUTO: 7.21 K/UL — SIGNIFICANT CHANGE UP (ref 1.8–7.4)
NEUTROPHILS NFR BLD AUTO: 67.3 % — SIGNIFICANT CHANGE UP (ref 43–77)
NRBC # BLD: 0 /100 WBCS — SIGNIFICANT CHANGE UP (ref 0–0)
PLATELET # BLD AUTO: 260 K/UL — SIGNIFICANT CHANGE UP (ref 150–400)
RBC # BLD: 4.99 M/UL — SIGNIFICANT CHANGE UP (ref 3.8–5.2)
RBC # FLD: 12.1 % — SIGNIFICANT CHANGE UP (ref 10.3–14.5)
WBC # BLD: 10.7 K/UL — HIGH (ref 3.8–10.5)
WBC # FLD AUTO: 10.7 K/UL — HIGH (ref 3.8–10.5)

## 2021-10-22 PROCEDURE — 99214 OFFICE O/P EST MOD 30 MIN: CPT

## 2021-10-22 NOTE — ASSESSMENT
[FreeTextEntry1] : Ms. CRYSTAL 's questions were answered to her satisfaction. She  expressed her  understanding and willingness to comply with the above recommendations, and  will return to the office in 6 months.\par \par \par \par \par \par \par

## 2021-10-22 NOTE — REVIEW OF SYSTEMS
[Patient Intake Form Reviewed] : Patient intake form was reviewed [Skin Wound] : skin wound [Negative] : Psychiatric [Fever] : no fever [Night Sweats] : no night sweats [Fatigue] : no fatigue [de-identified] : right breast postoperative eschar removed; now with good local granulation tissue

## 2021-10-22 NOTE — HISTORY OF PRESENT ILLNESS
[Disease: _____________________] : Disease: [unfilled] [T: ___] : T[unfilled] [N: ___] : N[unfilled] [M: ___] : M[unfilled] [AJCC Stage: ____] : AJCC Stage: [unfilled] [de-identified] : 50F, premenopausal, , with past medical history of fibrocystic breasts, adenomatous nodular goiter, hypothyroidism, and with right breast moderately differentiated infiltrating ductal carcinoma, ER/[place]+, Her 2 negative diagnosed  in 2020.\par \par CASE SYNOPSIS:\par 10/7/20- mammogram ([facility] for women's imaging): Bilateral scattered patchy areas of asymmetric breast tissue.  In the upper outer quadrant of the right breast anteriorly, distortion is noted.  Spot compression views and a true lateral view obtained with tomosynthesis.  Persistent asymmetric density and distortion is present.  Ultrasound performed for further evaluation\par 10/7/20- breast US: indeterminate asymmetry with distortion at 11:00 in the right breast anteriorly.  Ultrasound-guided core biopsy is recommended.\par 10/16/20 - US guided core biopsy R breast; pathology consistent with infiltrating ductal carcinoma, moderately differentiated, largest contiguous focus is at least 1.2 cm, no definitive LVI, no in situ component identified.\par 10/26/20 - breast MRI: 1.4 cm mass in the upper outer right breast, corresponding the newly diagnosed malignancy.  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  Margins negative, no LCIS or DCIS, 2 SLN + 3 LN not involved by tumor cells, stage I (pT1b, pN0), ER> 90%,[place]>90%, Her 2 negative.\par Left breast–ductal hyperplasia, nonatypical, sclerosing adenosis, cyst formation, and microcalcification.  Negative for malignancy.1SLN negative for malignancy.\par 21- starts Tamoxifen [de-identified] : infiltrating ductal carcinoma [de-identified] : ER> 90%,[place]>90%, Her 2 negative. [FreeTextEntry1] : Tamoxifen [de-identified] : Biannual oncology follow-up; last seen in the office in April 2021.  Compliant with tamoxifen with no significant side effects.  She did not have any recent plastic surgery after the right breast wound debridement in March 2021 (Dr. Noe).  Scar well-healed.  No nipple reconstruction yet.  No recent imaging studies.  On physical exam the left breast has an inferior quadrant small area of fat necrosis, which continues to decrease in size.  Hematologic picture stable, with normal serum tumor marker CA 27-29.  Continues to work full-time.  Received Covid vaccination and is considering covid booster.

## 2021-10-22 NOTE — PHYSICAL EXAM
[Fully active, able to carry on all pre-disease performance without restriction] : Status 0 - Fully active, able to carry on all pre-disease performance without restriction [Normal] : normal appearance, no rash, nodules, vesicles, ulcers, erythema [Obese] : obese [de-identified] : b/l EJ reconstruction - right breast scar well healed; no nipple reconstruction

## 2021-10-24 LAB
25(OH)D3 SERPL-MCNC: 26.8 NG/ML
ALBUMIN SERPL ELPH-MCNC: 4.4 G/DL
ALP BLD-CCNC: 73 U/L
ALT SERPL-CCNC: 12 U/L
ANION GAP SERPL CALC-SCNC: 12 MMOL/L
AST SERPL-CCNC: 14 U/L
BILIRUB SERPL-MCNC: 0.3 MG/DL
BUN SERPL-MCNC: 13 MG/DL
CALCIUM SERPL-MCNC: 9.3 MG/DL
CHLORIDE SERPL-SCNC: 105 MMOL/L
CO2 SERPL-SCNC: 22 MMOL/L
CREAT SERPL-MCNC: 0.83 MG/DL
GLUCOSE SERPL-MCNC: 97 MG/DL
POTASSIUM SERPL-SCNC: 4.7 MMOL/L
PROT SERPL-MCNC: 6.9 G/DL
SODIUM SERPL-SCNC: 139 MMOL/L

## 2021-10-25 LAB — CANCER AG27-29 SERPL-ACNC: 13 U/ML

## 2021-11-16 ENCOUNTER — RESULT REVIEW (OUTPATIENT)
Age: 51
End: 2021-11-16

## 2021-11-22 ENCOUNTER — APPOINTMENT (OUTPATIENT)
Dept: ENDOCRINOLOGY | Facility: CLINIC | Age: 51
End: 2021-11-22
Payer: COMMERCIAL

## 2021-11-22 VITALS
HEIGHT: 65 IN | SYSTOLIC BLOOD PRESSURE: 140 MMHG | BODY MASS INDEX: 45.15 KG/M2 | HEART RATE: 89 BPM | TEMPERATURE: 98 F | WEIGHT: 271 LBS | OXYGEN SATURATION: 98 % | DIASTOLIC BLOOD PRESSURE: 90 MMHG

## 2021-11-22 DIAGNOSIS — E04.2 NONTOXIC MULTINODULAR GOITER: ICD-10-CM

## 2021-11-22 PROCEDURE — 76536 US EXAM OF HEAD AND NECK: CPT | Mod: 52

## 2021-11-22 PROCEDURE — 99213 OFFICE O/P EST LOW 20 MIN: CPT | Mod: 25

## 2021-11-22 NOTE — ASSESSMENT
[FreeTextEntry1] : 50 year old female here for follow-up of hypothyroidism and thyroid nodules. \par Clinically hypothyroid\par Bilateral thyroid nodules displaying interval stability \par \par -Continue Levothyroxine 75 mcg daily \par -Check TFTs \par -Stable bilateral thyroid nodules ( Right sided heterogenous nodule was 1.49 x 0.76 x 0.58 cm) and left sided nodule was 0.63 x 0.37 x 0.47 cm heterogenous nodule)\par -FNA in 2020 of right sided nodule was benign \par \par -Follow up in 6 months. \par

## 2021-11-23 LAB
T4 FREE SERPL-MCNC: 1.4 NG/DL
TSH SERPL-ACNC: 0.92 UIU/ML

## 2022-02-22 ENCOUNTER — RX RENEWAL (OUTPATIENT)
Age: 52
End: 2022-02-22

## 2022-02-22 ENCOUNTER — APPOINTMENT (OUTPATIENT)
Dept: BREAST CENTER | Facility: CLINIC | Age: 52
End: 2022-02-22
Payer: COMMERCIAL

## 2022-02-22 VITALS
DIASTOLIC BLOOD PRESSURE: 73 MMHG | HEIGHT: 65 IN | HEART RATE: 76 BPM | WEIGHT: 271 LBS | BODY MASS INDEX: 45.15 KG/M2 | SYSTOLIC BLOOD PRESSURE: 126 MMHG

## 2022-02-22 DIAGNOSIS — S21.009A UNSPECIFIED OPEN WOUND OF UNSPECIFIED BREAST, INITIAL ENCOUNTER: ICD-10-CM

## 2022-02-22 DIAGNOSIS — Z09 ENCOUNTER FOR FOLLOW-UP EXAMINATION AFTER COMPLETED TREATMENT FOR CONDITIONS OTHER THAN MALIGNANT NEOPLASM: ICD-10-CM

## 2022-02-22 PROCEDURE — 99213 OFFICE O/P EST LOW 20 MIN: CPT

## 2022-02-22 RX ORDER — MELOXICAM 7.5 MG/1
7.5 TABLET ORAL TWICE DAILY
Qty: 60 | Refills: 0 | Status: DISCONTINUED | COMMUNITY
Start: 2021-07-15 | End: 2022-02-22

## 2022-02-22 NOTE — PHYSICAL EXAM
[EOMI] : extra ocular movement intact [Sclera nonicteric] : sclera nonicteric [Supple] : supple [No Supraclavicular Adenopathy] : no supraclavicular adenopathy [No Cervical Adenopathy] : no cervical adenopathy [Clear to Auscultation Bilat] : clear to auscultation bilaterally [Normal S1, S2] : normal S1 and S2 [Examined in the supine and seated position] : examined in the supine and seated position [Asymmetrical] : asymmetrical [No dominant masses] : no dominant masses in right breast  [No dominant masses] : no dominant masses left breast [No Axillary Lymphadenopathy] : no left axillary lymphadenopathy [de-identified] : Circular skin island with reduction pattern incision.  Stellate scar. [de-identified] : Circular skin island with reduction pattern incisions. [de-identified] : Low transverse scar.

## 2022-02-22 NOTE — HISTORY OF PRESENT ILLNESS
[FreeTextEntry1] : This is a 51 year old  female who was found to have an abnormality on her routine right mammogram and ultrasound .  An ultrasound guided core biopsy showed a moderately differentiated invasive ductal carcinoma, ER +OK+Her 2 neg tumor. Oncotype score is 16.\par \par She has had previous needle biopsies and cyst aspirations of her breasts.  \par \par She opted to undergo bilateral mastectomies with  EJ reconstruction on 11/30/2020. Pathology showed a 1.5 cm invasive cancer, SLN x2 neg. Oncotype score 16. She had a significant wound breakdown which is now fully healed. \par \par She is on Tamoxifen and tolerating it well (Dr. Eugene).\par \par She is exercising and eating well and has lost 15 pounds.  She would like to lose more weight before having her revision surgery. She is going to [facility] in September.

## 2022-04-12 ENCOUNTER — OUTPATIENT (OUTPATIENT)
Dept: OUTPATIENT SERVICES | Facility: HOSPITAL | Age: 52
LOS: 1 days | Discharge: HOME | End: 2022-04-12

## 2022-04-12 DIAGNOSIS — Z83.79 FAMILY HISTORY OF OTHER DISEASES OF THE DIGESTIVE SYSTEM: Chronic | ICD-10-CM

## 2022-04-12 DIAGNOSIS — C50.919 MALIGNANT NEOPLASM OF UNSPECIFIED SITE OF UNSPECIFIED FEMALE BREAST: ICD-10-CM

## 2022-04-18 ENCOUNTER — RESULT REVIEW (OUTPATIENT)
Age: 52
End: 2022-04-18

## 2022-04-18 ENCOUNTER — APPOINTMENT (OUTPATIENT)
Age: 52
End: 2022-04-18
Payer: COMMERCIAL

## 2022-04-18 VITALS
RESPIRATION RATE: 16 BRPM | HEIGHT: 65 IN | SYSTOLIC BLOOD PRESSURE: 113 MMHG | WEIGHT: 268.96 LBS | DIASTOLIC BLOOD PRESSURE: 70 MMHG | TEMPERATURE: 97.4 F | HEART RATE: 78 BPM | OXYGEN SATURATION: 98 % | BODY MASS INDEX: 44.81 KG/M2

## 2022-04-18 LAB
25(OH)D3 SERPL-MCNC: 35.1 NG/ML
ALBUMIN SERPL ELPH-MCNC: 4 G/DL
ALP BLD-CCNC: 81 U/L
ALT SERPL-CCNC: 9 U/L
ANION GAP SERPL CALC-SCNC: 12 MMOL/L
AST SERPL-CCNC: 13 U/L
BASOPHILS # BLD AUTO: 0.07 K/UL — SIGNIFICANT CHANGE UP (ref 0–0.2)
BASOPHILS NFR BLD AUTO: 1 % — SIGNIFICANT CHANGE UP (ref 0–2)
BILIRUB SERPL-MCNC: 0.4 MG/DL
BUN SERPL-MCNC: 10 MG/DL
CALCIUM SERPL-MCNC: 9.4 MG/DL
CHLORIDE SERPL-SCNC: 107 MMOL/L
CO2 SERPL-SCNC: 21 MMOL/L
CREAT SERPL-MCNC: 0.73 MG/DL
EGFR: 100 ML/MIN/1.73M2
EOSINOPHIL # BLD AUTO: 0.21 K/UL — SIGNIFICANT CHANGE UP (ref 0–0.5)
EOSINOPHIL NFR BLD AUTO: 3 % — SIGNIFICANT CHANGE UP (ref 0–6)
GLUCOSE SERPL-MCNC: 88 MG/DL
HCT VFR BLD CALC: 40.6 % — SIGNIFICANT CHANGE UP (ref 34.5–45)
HGB BLD-MCNC: 14.1 G/DL — SIGNIFICANT CHANGE UP (ref 11.5–15.5)
IMM GRANULOCYTES NFR BLD AUTO: 0.1 % — SIGNIFICANT CHANGE UP (ref 0–1.5)
LYMPHOCYTES # BLD AUTO: 2.3 K/UL — SIGNIFICANT CHANGE UP (ref 1–3.3)
LYMPHOCYTES # BLD AUTO: 32.7 % — SIGNIFICANT CHANGE UP (ref 13–44)
MCHC RBC-ENTMCNC: 29.4 PG — SIGNIFICANT CHANGE UP (ref 27–34)
MCHC RBC-ENTMCNC: 34.7 G/DL — SIGNIFICANT CHANGE UP (ref 32–36)
MCV RBC AUTO: 84.6 FL — SIGNIFICANT CHANGE UP (ref 80–100)
MONOCYTES # BLD AUTO: 0.52 K/UL — SIGNIFICANT CHANGE UP (ref 0–0.9)
MONOCYTES NFR BLD AUTO: 7.4 % — SIGNIFICANT CHANGE UP (ref 2–14)
NEUTROPHILS # BLD AUTO: 3.92 K/UL — SIGNIFICANT CHANGE UP (ref 1.8–7.4)
NEUTROPHILS NFR BLD AUTO: 55.8 % — SIGNIFICANT CHANGE UP (ref 43–77)
NRBC # BLD: 0 /100 WBCS — SIGNIFICANT CHANGE UP (ref 0–0)
PLATELET # BLD AUTO: 248 K/UL — SIGNIFICANT CHANGE UP (ref 150–400)
POTASSIUM SERPL-SCNC: 4.1 MMOL/L
PROT SERPL-MCNC: 6.4 G/DL
RBC # BLD: 4.8 M/UL — SIGNIFICANT CHANGE UP (ref 3.8–5.2)
RBC # FLD: 11.9 % — SIGNIFICANT CHANGE UP (ref 10.3–14.5)
SODIUM SERPL-SCNC: 140 MMOL/L
WBC # BLD: 7.03 K/UL — SIGNIFICANT CHANGE UP (ref 3.8–10.5)
WBC # FLD AUTO: 7.03 K/UL — SIGNIFICANT CHANGE UP (ref 3.8–10.5)

## 2022-04-18 PROCEDURE — 99213 OFFICE O/P EST LOW 20 MIN: CPT

## 2022-04-18 NOTE — PHYSICAL EXAM
[Fully active, able to carry on all pre-disease performance without restriction] : Status 0 - Fully active, able to carry on all pre-disease performance without restriction [Obese] : obese [Normal] : normal appearance, no rash, nodules, vesicles, ulcers, erythema [de-identified] : b/l EJ reconstruction - right breast scar well healed; no nipple reconstruction

## 2022-04-18 NOTE — REVIEW OF SYSTEMS
[Patient Intake Form Reviewed] : Patient intake form was reviewed [Skin Wound] : skin wound [Negative] : Psychiatric [Fever] : no fever [Night Sweats] : no night sweats [Fatigue] : no fatigue [de-identified] : right breast postoperative eschar removed; now with good local granulation tissue

## 2022-04-18 NOTE — HISTORY OF PRESENT ILLNESS
[Disease: _____________________] : Disease: [unfilled] [T: ___] : T[unfilled] [N: ___] : N[unfilled] [M: ___] : M[unfilled] [AJCC Stage: ____] : AJCC Stage: [unfilled] [de-identified] : 51F, premenopausal, , with past medical history of fibrocystic breasts, adenomatous nodular goiter, hypothyroidism, and with right breast moderately differentiated infiltrating ductal carcinoma, ER/[place]+, Her 2 negative diagnosed  in 2020.\par \par CASE SYNOPSIS:\par 10/7/20- mammogram ([facility] [place] for women's imaging): Bilateral scattered patchy areas of asymmetric breast tissue.  In the upper outer quadrant of the right breast anteriorly, distortion is noted.  Spot compression views and a true lateral view obtained with tomosynthesis.  Persistent asymmetric density and distortion is present.  Ultrasound performed for further evaluation\par 10/7/20- breast US: indeterminate asymmetry with distortion at 11:00 in the right breast anteriorly.  Ultrasound-guided core biopsy is recommended.\par 10/16/20 - US guided core biopsy R breast; pathology consistent with infiltrating ductal carcinoma, moderately differentiated, largest contiguous focus is at least 1.2 cm, no definitive LVI, no in situ component identified.\par 10/26/20 - breast MRI: 1.4 cm mass in the upper outer right breast, corresponding the newly diagnosed malignancy.  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  Margins negative, no LCIS or DCIS, 2 SLN + 3 LN not involved by tumor cells, stage I (pT1b, pN0), ER> 90%,[place]>90%, Her 2 negative.\par Left breast–ductal hyperplasia, nonatypical, sclerosing adenosis, cyst formation, and microcalcification.  Negative for malignancy.1SLN negative for malignancy.\par 21- starts Tamoxifen [de-identified] : infiltrating ductal carcinoma [de-identified] : ER> 90%,[place]>90%, Her 2 negative. [FreeTextEntry1] : Tamoxifen [de-identified] : Since her last visit in October 2021 patient continued to voluntarily lose weight (lost approximately 3 pounds since last visit).  Compliant with tamoxifen for almost a year (started in May 2021).  Reports no new symptoms, and physical exam essentially unchanged (stable bilateral thyroid nodules).  Does not contemplate nipple reconstruction as of yet.  The area of fat necrosis in the left breast unchanged in the past 6-month.  COVID vaccinated/booster.  Hematologic picture stable.  TSH in normal range; continues levothyroxine per endocrine recommendation.\par \par

## 2022-04-19 LAB — CANCER AG27-29 SERPL-ACNC: 11.8 U/ML

## 2022-05-09 ENCOUNTER — APPOINTMENT (OUTPATIENT)
Dept: ENDOCRINOLOGY | Facility: CLINIC | Age: 52
End: 2022-05-09
Payer: COMMERCIAL

## 2022-05-09 VITALS
DIASTOLIC BLOOD PRESSURE: 80 MMHG | TEMPERATURE: 97.8 F | HEART RATE: 111 BPM | WEIGHT: 268 LBS | OXYGEN SATURATION: 97 % | HEIGHT: 65 IN | SYSTOLIC BLOOD PRESSURE: 110 MMHG | BODY MASS INDEX: 44.65 KG/M2

## 2022-05-09 DIAGNOSIS — E04.1 NONTOXIC SINGLE THYROID NODULE: ICD-10-CM

## 2022-05-09 DIAGNOSIS — E03.9 HYPOTHYROIDISM, UNSPECIFIED: ICD-10-CM

## 2022-05-09 PROCEDURE — 76536 US EXAM OF HEAD AND NECK: CPT

## 2022-05-09 PROCEDURE — 99214 OFFICE O/P EST MOD 30 MIN: CPT | Mod: 25

## 2022-05-09 NOTE — IMPRESSION
[FreeTextEntry1] : Bilateral nodules displaying interval stability  [FreeTextEntry2] : Follow up in one year

## 2022-05-09 NOTE — PROCEDURE
[Trudev e 2008 model, 10-12 MHz frequencies] : multiple real time longitudinal and transverse images were obtained using a high resolution ultrasound with a linear transducer, Trudev e 2008 model, 10-12 MHz frequencies. All measurements will be reported as longitudinal x syd-posterior x transverse. [] : a homogeneous parenchyma [Right Thyroid] : right [Thin] : has a thin halo [No calcification] : no calcification [Left Thyroid] : left [Mid] : mid pole there is a  [Solid] : solid [Heterogeneous] : heterogenous nodule [Ovoid] : ovoid in shape [Smooth] : smooth [No] : does not have a halo [No calcifications] : no calcifications [Peripheral vascularity] : peripheral vascularity [2] : 2 [No abnormal lymph nodes are seen.] : no abnormal lymph nodes are seen [FreeTextEntry1] : 3.52 x 0.95 x 1.56 [FreeTextEntry5] : 3.35 x 1.49 x 1.1 [FreeTextEntry2] : 0.18 [FreeTextEntry3] : 0.55 x 0.39 x 0.46

## 2022-05-09 NOTE — HISTORY OF PRESENT ILLNESS
[FreeTextEntry1] : 51 year old female here for f/u of hypothyroidism \par Recently diagnosed with Stage I ( T1, N0, Mx), ER+, Her 2 negative, right breast infiltrating ductal carcinoma, s/p\par  bilateral mastectomy with EJ reconstructive surgery.\par \par \par Currently on levothyroxine 75 mcg daily \par Symptoms: \par Increased fatigue \par No Constipation \par aldo-menopausal \par No cold intolerance \par + Increased hair loss \par \par \par \par 08/14/2020:\par TSH of 7.82 and Free T4; 0.79 \par Most recent TSH of 2.5 on LT4 on 75 mcg \par \par Last visit found to have a right sided nodule of 1.62 x 0.82 x 0.66 cm \par Left sided cyst : 0.58 x 0.34 x 0.44 cm \par

## 2022-05-09 NOTE — PHYSICAL EXAM
[Alert] : alert [Well Nourished] : well nourished [No Acute Distress] : no acute distress [Normal Sclera/Conjunctiva] : normal sclera/conjunctiva [EOMI] : extra ocular movement intact [PERRL] : pupils equal, round and reactive to light [Normal Outer Ear/Nose] : the ears and nose were normal in appearance [Normal Hearing] : hearing was normal [Normal TMs] : both tympanic membranes were normal [No Neck Mass] : no neck mass was observed [Thyroid Not Enlarged] : the thyroid was not enlarged [No Respiratory Distress] : no respiratory distress [Clear to Auscultation] : lungs were clear to auscultation bilaterally [Normal S1, S2] : normal S1 and S2 [Normal Rate] : heart rate was normal [Regular Rhythm] : with a regular rhythm

## 2022-05-09 NOTE — ASSESSMENT
[FreeTextEntry1] : 51 year old female here for follow-up of hypothyroidism and thyroid nodules. \par Clinically hypothyroid\par Bilateral thyroid nodules displaying interval stability \par \par -Continue Levothyroxine 75 mcg daily \par -Check TFTs \par -Stable bilateral thyroid nodules ( Right sided heterogenous nodule was 1.39 x 0.77 x 0.62 cm) and left sided nodule was 0.55 x 0.39 x 0.46 cm heterogenous nodule)\par -FNA in 2020 of right sided nodule was benign \par \par -Follow up in 6 months. \par

## 2022-05-09 NOTE — PROCEDURE
[mylearnadfriend e 2008 model, 10-12 MHz frequencies] : multiple real time longitudinal and transverse images were obtained using a high resolution ultrasound with a linear transducer, mylearnadfriend e 2008 model, 10-12 MHz frequencies. All measurements will be reported as longitudinal x syd-posterior x transverse. [] : a homogeneous parenchyma [Right Thyroid] : right [Thin] : has a thin halo [No calcification] : no calcification [Left Thyroid] : left [Mid] : mid pole there is a  [Solid] : solid [Heterogeneous] : heterogenous nodule [Ovoid] : ovoid in shape [Smooth] : smooth [No] : does not have a halo [No calcifications] : no calcifications [Peripheral vascularity] : peripheral vascularity [2] : 2 [No abnormal lymph nodes are seen.] : no abnormal lymph nodes are seen [FreeTextEntry1] : 3.52 x 0.95 x 1.56 [FreeTextEntry5] : 3.35 x 1.49 x 1.1 [FreeTextEntry2] : 0.18 [FreeTextEntry3] : 0.55 x 0.39 x 0.46

## 2022-05-10 LAB
ALBUMIN SERPL ELPH-MCNC: 4.4 G/DL
ALP BLD-CCNC: 93 U/L
ALT SERPL-CCNC: 10 U/L
ANION GAP SERPL CALC-SCNC: 14 MMOL/L
AST SERPL-CCNC: 14 U/L
BILIRUB SERPL-MCNC: 0.4 MG/DL
BUN SERPL-MCNC: 10 MG/DL
CALCIUM SERPL-MCNC: 9.4 MG/DL
CHLORIDE SERPL-SCNC: 106 MMOL/L
CHOLEST SERPL-MCNC: 196 MG/DL
CO2 SERPL-SCNC: 23 MMOL/L
CREAT SERPL-MCNC: 0.85 MG/DL
EGFR: 83 ML/MIN/1.73M2
ESTIMATED AVERAGE GLUCOSE: 108 MG/DL
GLUCOSE SERPL-MCNC: 76 MG/DL
HBA1C MFR BLD HPLC: 5.4 %
HDLC SERPL-MCNC: 48 MG/DL
LDLC SERPL CALC-MCNC: 115 MG/DL
NONHDLC SERPL-MCNC: 148 MG/DL
POTASSIUM SERPL-SCNC: 4.3 MMOL/L
PROT SERPL-MCNC: 6.9 G/DL
SODIUM SERPL-SCNC: 143 MMOL/L
T4 FREE SERPL-MCNC: 1.3 NG/DL
TRIGL SERPL-MCNC: 166 MG/DL
TSH SERPL-ACNC: 1.03 UIU/ML

## 2022-05-25 RX ORDER — LEVOTHYROXINE SODIUM 0.07 MG/1
75 TABLET ORAL
Qty: 30 | Refills: 5 | Status: ACTIVE | COMMUNITY
Start: 2020-08-26 | End: 1900-01-01

## 2022-05-29 ENCOUNTER — TRANSCRIPTION ENCOUNTER (OUTPATIENT)
Age: 52
End: 2022-05-29

## 2022-06-07 ENCOUNTER — NON-APPOINTMENT (OUTPATIENT)
Age: 52
End: 2022-06-07

## 2022-06-08 ENCOUNTER — NON-APPOINTMENT (OUTPATIENT)
Age: 52
End: 2022-06-08

## 2022-06-08 ENCOUNTER — EMERGENCY (EMERGENCY)
Facility: HOSPITAL | Age: 52
LOS: 1 days | Discharge: HOME | End: 2022-06-08
Admitting: EMERGENCY MEDICINE
Payer: COMMERCIAL

## 2022-06-08 DIAGNOSIS — R10.9 UNSPECIFIED ABDOMINAL PAIN: ICD-10-CM

## 2022-06-08 DIAGNOSIS — N83.201 UNSPECIFIED OVARIAN CYST, RIGHT SIDE: ICD-10-CM

## 2022-06-08 DIAGNOSIS — Z83.79 FAMILY HISTORY OF OTHER DISEASES OF THE DIGESTIVE SYSTEM: Chronic | ICD-10-CM

## 2022-06-08 PROCEDURE — 74176 CT ABD & PELVIS W/O CONTRAST: CPT | Mod: 26,MA

## 2022-06-08 PROCEDURE — 76856 US EXAM PELVIC COMPLETE: CPT | Mod: 26

## 2022-06-08 PROCEDURE — 99285 EMERGENCY DEPT VISIT HI MDM: CPT

## 2022-06-08 PROCEDURE — 76830 TRANSVAGINAL US NON-OB: CPT | Mod: 26

## 2022-06-22 ENCOUNTER — NON-APPOINTMENT (OUTPATIENT)
Age: 52
End: 2022-06-22

## 2022-07-23 ENCOUNTER — RX RENEWAL (OUTPATIENT)
Age: 52
End: 2022-07-23

## 2022-08-02 ENCOUNTER — APPOINTMENT (OUTPATIENT)
Dept: BREAST CENTER | Facility: CLINIC | Age: 52
End: 2022-08-02

## 2022-08-18 ENCOUNTER — OUTPATIENT (OUTPATIENT)
Dept: OUTPATIENT SERVICES | Facility: HOSPITAL | Age: 52
LOS: 1 days | End: 2022-08-18

## 2022-08-18 DIAGNOSIS — N92.1 EXCESSIVE AND FREQUENT MENSTRUATION WITH IRREGULAR CYCLE: ICD-10-CM

## 2022-08-18 DIAGNOSIS — Z01.812 ENCOUNTER FOR PREPROCEDURAL LABORATORY EXAMINATION: ICD-10-CM

## 2022-08-18 DIAGNOSIS — Z83.79 FAMILY HISTORY OF OTHER DISEASES OF THE DIGESTIVE SYSTEM: Chronic | ICD-10-CM

## 2022-08-25 ENCOUNTER — OUTPATIENT (OUTPATIENT)
Dept: OUTPATIENT SERVICES | Facility: HOSPITAL | Age: 52
LOS: 1 days | End: 2022-08-25

## 2022-08-25 DIAGNOSIS — Z83.79 FAMILY HISTORY OF OTHER DISEASES OF THE DIGESTIVE SYSTEM: Chronic | ICD-10-CM

## 2022-08-25 PROCEDURE — 88307 TISSUE EXAM BY PATHOLOGIST: CPT | Mod: 26

## 2022-08-25 PROCEDURE — 88305 TISSUE EXAM BY PATHOLOGIST: CPT | Mod: 26

## 2022-09-05 DIAGNOSIS — N88.2 STRICTURE AND STENOSIS OF CERVIX UTERI: ICD-10-CM

## 2022-09-05 DIAGNOSIS — N84.0 POLYP OF CORPUS UTERI: ICD-10-CM

## 2022-09-05 DIAGNOSIS — N93.8 OTHER SPECIFIED ABNORMAL UTERINE AND VAGINAL BLEEDING: ICD-10-CM

## 2022-10-01 ENCOUNTER — NON-APPOINTMENT (OUTPATIENT)
Age: 52
End: 2022-10-01

## 2022-10-10 ENCOUNTER — OUTPATIENT (OUTPATIENT)
Dept: OUTPATIENT SERVICES | Facility: HOSPITAL | Age: 52
LOS: 1 days | Discharge: HOME | End: 2022-10-10

## 2022-10-10 DIAGNOSIS — Z83.79 FAMILY HISTORY OF OTHER DISEASES OF THE DIGESTIVE SYSTEM: Chronic | ICD-10-CM

## 2022-10-10 DIAGNOSIS — C50.919 MALIGNANT NEOPLASM OF UNSPECIFIED SITE OF UNSPECIFIED FEMALE BREAST: ICD-10-CM

## 2022-10-14 ENCOUNTER — RESULT REVIEW (OUTPATIENT)
Age: 52
End: 2022-10-14

## 2022-10-14 ENCOUNTER — APPOINTMENT (OUTPATIENT)
Age: 52
End: 2022-10-14

## 2022-10-14 VITALS
RESPIRATION RATE: 16 BRPM | WEIGHT: 266.76 LBS | SYSTOLIC BLOOD PRESSURE: 117 MMHG | HEART RATE: 79 BPM | HEIGHT: 64.96 IN | OXYGEN SATURATION: 97 % | BODY MASS INDEX: 44.44 KG/M2 | DIASTOLIC BLOOD PRESSURE: 82 MMHG | TEMPERATURE: 97.5 F

## 2022-10-14 LAB
BASOPHILS # BLD AUTO: 0.12 K/UL — SIGNIFICANT CHANGE UP (ref 0–0.2)
BASOPHILS NFR BLD AUTO: 1.4 % — SIGNIFICANT CHANGE UP (ref 0–2)
EOSINOPHIL # BLD AUTO: 1.19 K/UL — HIGH (ref 0–0.5)
EOSINOPHIL NFR BLD AUTO: 14.3 % — HIGH (ref 0–6)
HCT VFR BLD CALC: 39.6 % — SIGNIFICANT CHANGE UP (ref 34.5–45)
HGB BLD-MCNC: 13.7 G/DL — SIGNIFICANT CHANGE UP (ref 11.5–15.5)
IMM GRANULOCYTES NFR BLD AUTO: 0.4 % — SIGNIFICANT CHANGE UP (ref 0–0.9)
LYMPHOCYTES # BLD AUTO: 2.22 K/UL — SIGNIFICANT CHANGE UP (ref 1–3.3)
LYMPHOCYTES # BLD AUTO: 26.7 % — SIGNIFICANT CHANGE UP (ref 13–44)
MCHC RBC-ENTMCNC: 29 PG — SIGNIFICANT CHANGE UP (ref 27–34)
MCHC RBC-ENTMCNC: 34.6 G/DL — SIGNIFICANT CHANGE UP (ref 32–36)
MCV RBC AUTO: 83.9 FL — SIGNIFICANT CHANGE UP (ref 80–100)
MONOCYTES # BLD AUTO: 0.54 K/UL — SIGNIFICANT CHANGE UP (ref 0–0.9)
MONOCYTES NFR BLD AUTO: 6.5 % — SIGNIFICANT CHANGE UP (ref 2–14)
NEUTROPHILS # BLD AUTO: 4.22 K/UL — SIGNIFICANT CHANGE UP (ref 1.8–7.4)
NEUTROPHILS NFR BLD AUTO: 50.7 % — SIGNIFICANT CHANGE UP (ref 43–77)
NRBC # BLD: 0 /100 WBCS — SIGNIFICANT CHANGE UP (ref 0–0)
PLATELET # BLD AUTO: 305 K/UL — SIGNIFICANT CHANGE UP (ref 150–400)
RBC # BLD: 4.72 M/UL — SIGNIFICANT CHANGE UP (ref 3.8–5.2)
RBC # FLD: 12.1 % — SIGNIFICANT CHANGE UP (ref 10.3–14.5)
WBC # BLD: 8.32 K/UL — SIGNIFICANT CHANGE UP (ref 3.8–10.5)
WBC # FLD AUTO: 8.32 K/UL — SIGNIFICANT CHANGE UP (ref 3.8–10.5)

## 2022-10-14 PROCEDURE — 99214 OFFICE O/P EST MOD 30 MIN: CPT

## 2022-10-14 NOTE — PHYSICAL EXAM
[Bra Size: _______] : Bra Size: [unfilled] [de-identified] : bilateral breasts soft, nt. large breasts with grade 2-3 nipple ptosis.  [de-identified] : abdomen soft, nt. +large pannus with adequate excess soft tissue for bilateral breast reconstruction. no hernia or masses palpated.  Crescentic Advancement Flap Text: The defect edges were debeveled with a #15 scalpel blade.  Given the location of the defect and the proximity to free margins a crescentic advancement flap was deemed most appropriate.  Using a sterile surgical marker, the appropriate advancement flap was drawn incorporating the defect and placing the expected incisions within the relaxed skin tension lines where possible.    The area thus outlined was incised deep to adipose tissue with a #15 scalpel blade.  The skin margins were undermined to an appropriate distance in all directions utilizing iris scissors.

## 2022-10-17 LAB
25(OH)D3 SERPL-MCNC: 33.8 NG/ML
ALBUMIN SERPL ELPH-MCNC: 4 G/DL
ALP BLD-CCNC: 112 U/L
ALT SERPL-CCNC: 12 U/L
ANION GAP SERPL CALC-SCNC: 12 MMOL/L
APTT BLD: 30.6 SEC
AST SERPL-CCNC: 16 U/L
BILIRUB SERPL-MCNC: 0.4 MG/DL
BUN SERPL-MCNC: 9 MG/DL
CALCIUM SERPL-MCNC: 9.5 MG/DL
CANCER AG27-29 SERPL-ACNC: 10.8 U/ML
CHLORIDE SERPL-SCNC: 107 MMOL/L
CO2 SERPL-SCNC: 22 MMOL/L
CREAT SERPL-MCNC: 0.84 MG/DL
EGFR: 85 ML/MIN/1.73M2
GLUCOSE SERPL-MCNC: 98 MG/DL
INR PPP: 1.08 RATIO
POTASSIUM SERPL-SCNC: 4 MMOL/L
PROT SERPL-MCNC: 6.7 G/DL
PT BLD: 12.5 SEC
SODIUM SERPL-SCNC: 141 MMOL/L

## 2022-10-17 NOTE — PHYSICAL EXAM
[Fully active, able to carry on all pre-disease performance without restriction] : Status 0 - Fully active, able to carry on all pre-disease performance without restriction [Obese] : obese [Normal] : normal appearance, no rash, nodules, vesicles, ulcers, erythema [de-identified] : b/l JE reconstruction - scars well healed; no nipple reconstruction

## 2022-10-17 NOTE — REVIEW OF SYSTEMS
[Skin Wound] : skin wound [Negative] : Psychiatric [Fever] : no fever [Night Sweats] : no night sweats [Fatigue] : no fatigue [Dysmenorrhea/Abn Vaginal Bleeding] : dysmenorrhea/abnormal vaginal bleeding [de-identified] : right breast postoperative eschar removed; now with good local granulation tissue

## 2022-10-17 NOTE — HISTORY OF PRESENT ILLNESS
[Disease: _____________________] : Disease: [unfilled] [T: ___] : T[unfilled] [N: ___] : N[unfilled] [M: ___] : M[unfilled] [AJCC Stage: ____] : AJCC Stage: [unfilled] [de-identified] : 51F, premenopausal, , with past medical history of fibrocystic breasts, adenomatous nodular goiter, hypothyroidism, and with right breast moderately differentiated infiltrating ductal carcinoma, ER/[place]+, Her 2 negative diagnosed  in 2020.\par \par CASE SYNOPSIS:\par 10/7/20- mammogram ([facility] for women's imaging): Bilateral scattered patchy areas of asymmetric breast tissue.  In the upper outer quadrant of the right breast anteriorly, distortion is noted.  Spot compression views and a true lateral view obtained with tomosynthesis.  Persistent asymmetric density and distortion is present.  Ultrasound performed for further evaluation\par 10/7/20- breast US: indeterminate asymmetry with distortion at 11:00 in the right breast anteriorly.  Ultrasound-guided core biopsy is recommended.\par 10/16/20 - US guided core biopsy R breast; pathology consistent with infiltrating ductal carcinoma, moderately differentiated, largest contiguous focus is at least 1.2 cm, no definitive LVI, no in situ component identified.\par 10/26/20 - breast MRI: 1.4 cm mass in the upper outer right breast, corresponding the newly diagnosed malignancy.  No MRI evidence of multicentric or contralateral disease.\par 2020–Oncotype DX recurrence score 16 (low).\par 2020- [facility] Diagnostic Testing Results: Heterozygous TSC 2 (c.1458 C > G(p.[id number])) =VUS 1 (variant of uncertain significance), not actionable.\par 20- bilateral mastectomy with the EJ reconstruction; surgical pathology consistent with:\par Right breast infiltrating ductal carcinoma measuring 1.5 cm, no LVI, [place] score 6/9, evident ductal hyperplasia, nonatypical, sclerosing adenosis, apocrine metaplasia and cyst formation as well as microcalcification in benign tissue.  Margins negative, no LCIS or DCIS, 2 SLN + 3 LN not involved by tumor cells, stage I (pT1b, pN0), ER> 90%,[place]>90%, Her 2 negative.\par Left breast–ductal hyperplasia, nonatypical, sclerosing adenosis, cyst formation, and microcalcification.  Negative for malignancy.1SLN negative for malignancy.\par 21- starts Tamoxifen\par 2022–pending EMILY/BSO [de-identified] : infiltrating ductal carcinoma [de-identified] : ER> 90%,[place]>90%, Her 2 negative. [FreeTextEntry1] : Tamoxifen [de-identified] : Returning for hematologic follow-up.  Overall feels great and reports no significant side effects while on tamoxifen.\par Patient has been actively followed by GYN oncology (Dr. Ruchi Malcolm) due to multiple uterine polyps.\par EMILY/BSO yet to be scheduled in November 2022.\par Patient denies new constitutional symptoms; she is still unemployed, but interviewing.\par Hematologic picture stable\par \par \par \par \par

## 2022-10-30 ENCOUNTER — NON-APPOINTMENT (OUTPATIENT)
Age: 52
End: 2022-10-30

## 2023-01-30 ENCOUNTER — OUTPATIENT (OUTPATIENT)
Dept: OUTPATIENT SERVICES | Facility: HOSPITAL | Age: 53
LOS: 1 days | Discharge: HOME | End: 2023-01-30

## 2023-01-30 DIAGNOSIS — C50.919 MALIGNANT NEOPLASM OF UNSPECIFIED SITE OF UNSPECIFIED FEMALE BREAST: ICD-10-CM

## 2023-01-30 DIAGNOSIS — Z83.79 FAMILY HISTORY OF OTHER DISEASES OF THE DIGESTIVE SYSTEM: Chronic | ICD-10-CM

## 2023-02-02 NOTE — PATIENT PROFILE ADULT - NS PRO AD PATIENT TYPE
Patient improved with medication. Sleeping comfortably. No more episodes of N/V. She states her pain is improved. Pain significantly improved, reading of fat necrosis on CT, No signs of infection or abscess. Patient aware, will follow up with her surgeon, Dr. Angeles.  Pt is ready for discharge and safe discharge has been established. Pt was treated for abd pain and showed improvement. Will d/c with outpatient follow-up. Strict return-precautions were given and understanding was verbalized by patient.  I have discussed the discharge plan with the patient. The patient agrees with the plan, as discussed. The patient understands Emergency Department diagnosis is a preliminary diagnosis often based on limited information and that the patient must adhere to the follow-up plan as discussed. The patient understands that if the symptoms worsen or if prescribed medications do not have the desired/planned effect that the patient may return to the Emergency Department at any time for further evaluation and treatment. Pain significantly improved, reading of fat necrosis on CT, No signs of infection or abscess. Patient aware, will follow up with her surgeon, Dr. Angeles. Spoke to Dr. Angeles on phone who confirmed he will f/u with pt, he is aware of CT results.  Pt is ready for discharge and safe discharge has been established. Pt was treated for abd pain and showed improvement. Will d/c with outpatient follow-up. Strict return-precautions were given and understanding was verbalized by patient.  I have discussed the discharge plan with the patient. The patient agrees with the plan, as discussed. The patient understands Emergency Department diagnosis is a preliminary diagnosis often based on limited information and that the patient must adhere to the follow-up plan as discussed. The patient understands that if the symptoms worsen or if prescribed medications do not have the desired/planned effect that the patient may return to the Emergency Department at any time for further evaluation and treatment. Pain significantly improved, Tolerating p.o. Labs WNL. Reading of fat necrosis on CT, No signs of infection or abscess. Patient aware, will follow up with her surgeon, Dr. Angeles. Spoke to Dr. Angeles on phone who confirmed he will f/u with pt, he is aware of CT results.  Pt is ready for discharge and safe discharge has been established. Pt was treated for abd pain and showed improvement. Will d/c with outpatient follow-up. Strict return-precautions were given and understanding was verbalized by patient.  I have discussed the discharge plan with the patient. The patient agrees with the plan, as discussed. The patient understands Emergency Department diagnosis is a preliminary diagnosis often based on limited information and that the patient must adhere to the follow-up plan as discussed. The patient understands that if the symptoms worsen or if prescribed medications do not have the desired/planned effect that the patient may return to the Emergency Department at any time for further evaluation and treatment. Health Care Proxy (HCP)

## 2023-02-07 ENCOUNTER — APPOINTMENT (OUTPATIENT)
Age: 53
End: 2023-02-07
Payer: COMMERCIAL

## 2023-02-07 VITALS
TEMPERATURE: 97 F | OXYGEN SATURATION: 98 % | HEART RATE: 80 BPM | SYSTOLIC BLOOD PRESSURE: 122 MMHG | DIASTOLIC BLOOD PRESSURE: 77 MMHG | HEIGHT: 64.96 IN | WEIGHT: 270.04 LBS | RESPIRATION RATE: 16 BRPM | BODY MASS INDEX: 44.99 KG/M2

## 2023-02-07 PROCEDURE — 99214 OFFICE O/P EST MOD 30 MIN: CPT

## 2023-02-07 RX ORDER — IBUPROFEN 800 MG/1
800 TABLET, FILM COATED ORAL
Qty: 40 | Refills: 0 | Status: DISCONTINUED | COMMUNITY
Start: 2022-11-18

## 2023-02-07 RX ORDER — OXYCODONE AND ACETAMINOPHEN 5; 325 MG/1; MG/1
5-325 TABLET ORAL
Qty: 15 | Refills: 0 | Status: DISCONTINUED | COMMUNITY
Start: 2022-11-18

## 2023-02-07 NOTE — REVIEW OF SYSTEMS
[Dysmenorrhea/Abn Vaginal Bleeding] : dysmenorrhea/abnormal vaginal bleeding [Skin Wound] : skin wound [Negative] : Psychiatric [Fever] : no fever [Night Sweats] : no night sweats [Fatigue] : no fatigue [de-identified] : right breast postoperative eschar removed; now with good local granulation tissue

## 2023-02-07 NOTE — HISTORY OF PRESENT ILLNESS
[Disease: _____________________] : Disease: [unfilled] [T: ___] : T[unfilled] [N: ___] : N[unfilled] [M: ___] : M[unfilled] [AJCC Stage: ____] : AJCC Stage: [unfilled] [de-identified] : 52F, premenopausal, , with past medical history of fibrocystic breasts, adenomatous nodular goiter, hypothyroidism, and with right breast moderately differentiated infiltrating ductal carcinoma, ER/[place]+, Her 2 negative diagnosed  in 2020.\par \par CASE SYNOPSIS:\par 10/7/20- mammogram ([facility]): Bilateral scattered patchy areas of asymmetric breast tissue.  In the upper outer quadrant of the right breast anteriorly, distortion is noted.  Spot compression views and a true lateral view obtained with tomosynthesis.  Persistent asymmetric density and distortion is present.  Ultrasound performed for further evaluation\par 10/7/20- breast US: indeterminate asymmetry with distortion at 11:00 in the right breast anteriorly.  Ultrasound-guided core biopsy is recommended.\par 10/16/20 - US guided core biopsy R breast; pathology consistent with infiltrating ductal carcinoma, moderately differentiated, largest contiguous focus is at least 1.2 cm, no definitive LVI, no in situ component identified.\par 10/26/20 - breast MRI: 1.4 cm mass in the upper outer right breast, corresponding the newly diagnosed malignancy.  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  Margins negative, no LCIS or DCIS, 2 SLN + 3 LN not involved by tumor cells, stage I (pT1b, pN0), ER> 90%,[place]>90%, Her 2 negative.\par Left breast–ductal hyperplasia, nonatypical, sclerosing adenosis, cyst formation, and microcalcification.  Negative for malignancy.1SLN negative for malignancy.\par 21- starts Tamoxifen\par 2022– EMILY/BSO ( Dr. Malcolm) [de-identified] : infiltrating ductal carcinoma [de-identified] : ER> 90%,[place]>90%, Her 2 negative. [FreeTextEntry1] : Tamoxifen [de-identified] : Following up for breast cancer.  Patient with recent history of right knee arthralgia in November 2022 (knee strained during hiking in [place]).  Physical examination otherwise unchanged.  Had EMILY/BSO for multiple uterine polyps ( November 18, 2022 - Dr. Ruchi Malcolm).\par Compliant with tamoxifen since May 2021.  Reports no venous thrombotic events.  Blood work from October 2022 shows correction of vitamin D level and normalization of WBC.  Still unemployed.\par \par

## 2023-02-07 NOTE — PHYSICAL EXAM
[Fully active, able to carry on all pre-disease performance without restriction] : Status 0 - Fully active, able to carry on all pre-disease performance without restriction [Obese] : obese [Normal] : normal appearance, no rash, nodules, vesicles, ulcers, erythema [de-identified] : b/l EJ reconstruction - scars well healed; no nipple reconstruction

## 2023-02-24 ENCOUNTER — TRANSCRIPTION ENCOUNTER (OUTPATIENT)
Age: 53
End: 2023-02-24

## 2023-03-03 ENCOUNTER — TRANSCRIPTION ENCOUNTER (OUTPATIENT)
Age: 53
End: 2023-03-03

## 2023-08-10 ENCOUNTER — OUTPATIENT (OUTPATIENT)
Dept: OUTPATIENT SERVICES | Facility: HOSPITAL | Age: 53
LOS: 1 days | Discharge: HOME | End: 2023-08-10

## 2023-08-10 DIAGNOSIS — C50.919 MALIGNANT NEOPLASM OF UNSPECIFIED SITE OF UNSPECIFIED FEMALE BREAST: ICD-10-CM

## 2023-08-10 DIAGNOSIS — Z83.79 FAMILY HISTORY OF OTHER DISEASES OF THE DIGESTIVE SYSTEM: Chronic | ICD-10-CM

## 2023-08-18 ENCOUNTER — RESULT REVIEW (OUTPATIENT)
Age: 53
End: 2023-08-18

## 2023-08-18 ENCOUNTER — APPOINTMENT (OUTPATIENT)
Age: 53
End: 2023-08-18
Payer: COMMERCIAL

## 2023-08-18 VITALS
DIASTOLIC BLOOD PRESSURE: 80 MMHG | HEART RATE: 84 BPM | OXYGEN SATURATION: 99 % | WEIGHT: 268.74 LBS | BODY MASS INDEX: 44.78 KG/M2 | TEMPERATURE: 97.2 F | SYSTOLIC BLOOD PRESSURE: 116 MMHG | RESPIRATION RATE: 16 BRPM

## 2023-08-18 LAB
BASOPHILS # BLD AUTO: 0.07 K/UL — SIGNIFICANT CHANGE UP (ref 0–0.2)
BASOPHILS NFR BLD AUTO: 1.1 % — SIGNIFICANT CHANGE UP (ref 0–2)
EOSINOPHIL # BLD AUTO: 0.18 K/UL — SIGNIFICANT CHANGE UP (ref 0–0.5)
EOSINOPHIL NFR BLD AUTO: 2.8 % — SIGNIFICANT CHANGE UP (ref 0–6)
HCT VFR BLD CALC: 39.4 % — SIGNIFICANT CHANGE UP (ref 34.5–45)
HGB BLD-MCNC: 13.5 G/DL — SIGNIFICANT CHANGE UP (ref 11.5–15.5)
IMM GRANULOCYTES NFR BLD AUTO: 0.3 % — SIGNIFICANT CHANGE UP (ref 0–0.9)
LYMPHOCYTES # BLD AUTO: 1.6 K/UL — SIGNIFICANT CHANGE UP (ref 1–3.3)
LYMPHOCYTES # BLD AUTO: 25 % — SIGNIFICANT CHANGE UP (ref 13–44)
MCHC RBC-ENTMCNC: 29.5 PG — SIGNIFICANT CHANGE UP (ref 27–34)
MCHC RBC-ENTMCNC: 34.3 G/DL — SIGNIFICANT CHANGE UP (ref 32–36)
MCV RBC AUTO: 86 FL — SIGNIFICANT CHANGE UP (ref 80–100)
MONOCYTES # BLD AUTO: 0.38 K/UL — SIGNIFICANT CHANGE UP (ref 0–0.9)
MONOCYTES NFR BLD AUTO: 5.9 % — SIGNIFICANT CHANGE UP (ref 2–14)
NEUTROPHILS # BLD AUTO: 4.15 K/UL — SIGNIFICANT CHANGE UP (ref 1.8–7.4)
NEUTROPHILS NFR BLD AUTO: 64.9 % — SIGNIFICANT CHANGE UP (ref 43–77)
NRBC # BLD: 0 /100 WBCS — SIGNIFICANT CHANGE UP (ref 0–0)
PLATELET # BLD AUTO: 236 K/UL — SIGNIFICANT CHANGE UP (ref 150–400)
RBC # BLD: 4.58 M/UL — SIGNIFICANT CHANGE UP (ref 3.8–5.2)
RBC # FLD: 12 % — SIGNIFICANT CHANGE UP (ref 10.3–14.5)
WBC # BLD: 6.4 K/UL — SIGNIFICANT CHANGE UP (ref 3.8–10.5)
WBC # FLD AUTO: 6.4 K/UL — SIGNIFICANT CHANGE UP (ref 3.8–10.5)

## 2023-08-18 PROCEDURE — 99214 OFFICE O/P EST MOD 30 MIN: CPT

## 2023-08-18 RX ORDER — TAMOXIFEN CITRATE 20 MG/1
20 TABLET, FILM COATED ORAL
Qty: 90 | Refills: 1 | Status: ACTIVE | COMMUNITY
Start: 2021-04-01 | End: 1900-01-01

## 2023-08-19 LAB
25(OH)D3 SERPL-MCNC: 31.3 NG/ML
ALBUMIN SERPL ELPH-MCNC: 4 G/DL
ALP BLD-CCNC: 100 U/L
ALT SERPL-CCNC: 9 U/L
ANION GAP SERPL CALC-SCNC: 12 MMOL/L
AST SERPL-CCNC: 13 U/L
BILIRUB SERPL-MCNC: 0.4 MG/DL
BUN SERPL-MCNC: 7 MG/DL
CALCIUM SERPL-MCNC: 8.9 MG/DL
CHLORIDE SERPL-SCNC: 107 MMOL/L
CO2 SERPL-SCNC: 23 MMOL/L
CREAT SERPL-MCNC: 0.83 MG/DL
EGFR: 85 ML/MIN/1.73M2
GLUCOSE SERPL-MCNC: 104 MG/DL
POTASSIUM SERPL-SCNC: 4.3 MMOL/L
PROT SERPL-MCNC: 6.6 G/DL
SODIUM SERPL-SCNC: 142 MMOL/L

## 2023-08-19 NOTE — HISTORY OF PRESENT ILLNESS
[Disease: _____________________] : Disease: [unfilled] [T: ___] : T[unfilled] [N: ___] : N[unfilled] [M: ___] : M[unfilled] [AJCC Stage: ____] : AJCC Stage: [unfilled] [de-identified] : 52F, premenopausal, , with past medical history of fibrocystic breasts, adenomatous nodular goiter, hypothyroidism, and with right breast moderately differentiated infiltrating ductal carcinoma, ER/[place]+, Her 2 negative diagnosed  in 2020.\par  \par  CASE SYNOPSIS:\par  10/7/20- mammogram ([facility]): Bilateral scattered patchy areas of asymmetric breast tissue.  In the upper outer quadrant of the right breast anteriorly, distortion is noted.  Spot compression views and a true lateral view obtained with tomosynthesis.  Persistent asymmetric density and distortion is present.  Ultrasound performed for further evaluation\par  10/7/20- breast US: indeterminate asymmetry with distortion at 11:00 in the right breast anteriorly.  Ultrasound-guided core biopsy is recommended.\par  10/16/20 - US guided core biopsy R breast; pathology consistent with infiltrating ductal carcinoma, moderately differentiated, largest contiguous focus is at least 1.2 cm, no definitive LVI, no in situ component identified.\par  10/26/20 - breast MRI: 1.4 cm mass in the upper outer right breast, corresponding the newly diagnosed malignancy.  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  Margins negative, no LCIS or DCIS, 2 SLN + 3 LN not involved by tumor cells, stage I (pT1b, pN0), ER> 90%,[place]>90%, Her 2 negative.\par  Left breast-ductal hyperplasia, nonatypical, sclerosing adenosis, cyst formation, and microcalcification.  Negative for malignancy.1SLN negative for malignancy.\par  21- starts Tamoxifen\par  2022- EMILY/BSO ( Dr. Malcolm) [de-identified] : infiltrating ductal carcinoma [FreeTextEntry1] : Tamoxifen [de-identified] : ER> 90%,[place]>90%, Her 2 negative. [de-identified] : Here for biannual oncology follow-up.  Reports some fatigue, insomnia and difficulty digesting gluten.  Has been on 3 years of tamoxifen with minimal side effects.  Reports no venous thrombotic event or vaginal discharge.  Is contemplating revision of bilateral D IEP breast reconstruction with Dr. Jason Noe. From social perspective, patient recently hired (in April 2023); very satisfied with her new position. Blood work today in normal range.

## 2023-08-19 NOTE — PHYSICAL EXAM
[Fully active, able to carry on all pre-disease performance without restriction] : Status 0 - Fully active, able to carry on all pre-disease performance without restriction [Obese] : obese [Normal] : normal appearance, no rash, nodules, vesicles, ulcers, erythema [Restricted in physically strenuous activity but ambulatory and able to carry out work of a light or sedentary nature] : Status 1- Restricted in physically strenuous activity but ambulatory and able to carry out work of a light or sedentary nature, e.g., light house work, office work [de-identified] : b/l EJ reconstruction - scars well healed; no nipple reconstruction

## 2023-08-19 NOTE — REVIEW OF SYSTEMS
[Dysmenorrhea/Abn Vaginal Bleeding] : dysmenorrhea/abnormal vaginal bleeding [Skin Wound] : skin wound [Negative] : Psychiatric [Fever] : no fever [Night Sweats] : no night sweats [Fatigue] : no fatigue [de-identified] : right breast postoperative eschar removed; now with good local granulation tissue

## 2023-08-30 RX ORDER — LEVOTHYROXINE SODIUM 75 UG/1
75 TABLET ORAL
Qty: 90 | Refills: 0 | Status: ACTIVE | COMMUNITY
Start: 2022-05-29 | End: 1900-01-01

## 2024-02-06 ENCOUNTER — OUTPATIENT (OUTPATIENT)
Dept: OUTPATIENT SERVICES | Facility: HOSPITAL | Age: 54
LOS: 1 days | Discharge: HOME | End: 2024-02-06

## 2024-02-06 DIAGNOSIS — C50.919 MALIGNANT NEOPLASM OF UNSPECIFIED SITE OF UNSPECIFIED FEMALE BREAST: ICD-10-CM

## 2024-02-06 DIAGNOSIS — Z83.79 FAMILY HISTORY OF OTHER DISEASES OF THE DIGESTIVE SYSTEM: Chronic | ICD-10-CM

## 2024-02-16 ENCOUNTER — RESULT REVIEW (OUTPATIENT)
Age: 54
End: 2024-02-16

## 2024-02-16 ENCOUNTER — APPOINTMENT (OUTPATIENT)
Dept: HEMATOLOGY ONCOLOGY | Facility: CLINIC | Age: 54
End: 2024-02-16
Payer: COMMERCIAL

## 2024-02-16 DIAGNOSIS — C50.411 MALIGNANT NEOPLASM OF UPPER-OUTER QUADRANT OF RIGHT FEMALE BREAST: ICD-10-CM

## 2024-02-16 DIAGNOSIS — Z17.0 MALIGNANT NEOPLASM OF UPPER-OUTER QUADRANT OF RIGHT FEMALE BREAST: ICD-10-CM

## 2024-02-16 LAB
BASOPHILS # BLD AUTO: 0.06 K/UL — SIGNIFICANT CHANGE UP (ref 0–0.2)
BASOPHILS NFR BLD AUTO: 1.1 % — SIGNIFICANT CHANGE UP (ref 0–2)
EOSINOPHIL # BLD AUTO: 0.12 K/UL — SIGNIFICANT CHANGE UP (ref 0–0.5)
EOSINOPHIL NFR BLD AUTO: 2.3 % — SIGNIFICANT CHANGE UP (ref 0–6)
HCT VFR BLD CALC: 38.8 % — SIGNIFICANT CHANGE UP (ref 34.5–45)
HGB BLD-MCNC: 13.3 G/DL — SIGNIFICANT CHANGE UP (ref 11.5–15.5)
IMM GRANULOCYTES NFR BLD AUTO: 0.2 % — SIGNIFICANT CHANGE UP (ref 0–0.9)
LYMPHOCYTES # BLD AUTO: 1.51 K/UL — SIGNIFICANT CHANGE UP (ref 1–3.3)
LYMPHOCYTES # BLD AUTO: 28.3 % — SIGNIFICANT CHANGE UP (ref 13–44)
MCHC RBC-ENTMCNC: 29.6 PG — SIGNIFICANT CHANGE UP (ref 27–34)
MCHC RBC-ENTMCNC: 34.3 G/DL — SIGNIFICANT CHANGE UP (ref 32–36)
MCV RBC AUTO: 86.4 FL — SIGNIFICANT CHANGE UP (ref 80–100)
MONOCYTES # BLD AUTO: 0.31 K/UL — SIGNIFICANT CHANGE UP (ref 0–0.9)
MONOCYTES NFR BLD AUTO: 5.8 % — SIGNIFICANT CHANGE UP (ref 2–14)
NEUTROPHILS # BLD AUTO: 3.32 K/UL — SIGNIFICANT CHANGE UP (ref 1.8–7.4)
NEUTROPHILS NFR BLD AUTO: 62.3 % — SIGNIFICANT CHANGE UP (ref 43–77)
NRBC # BLD: 0 /100 WBCS — SIGNIFICANT CHANGE UP (ref 0–0)
PLATELET # BLD AUTO: 225 K/UL — SIGNIFICANT CHANGE UP (ref 150–400)
RBC # BLD: 4.49 M/UL — SIGNIFICANT CHANGE UP (ref 3.8–5.2)
RBC # FLD: 11.9 % — SIGNIFICANT CHANGE UP (ref 10.3–14.5)
WBC # BLD: 5.33 K/UL — SIGNIFICANT CHANGE UP (ref 3.8–10.5)
WBC # FLD AUTO: 5.33 K/UL — SIGNIFICANT CHANGE UP (ref 3.8–10.5)

## 2024-02-16 PROCEDURE — 99214 OFFICE O/P EST MOD 30 MIN: CPT

## 2024-02-18 PROBLEM — C50.411 MALIGNANT NEOPLASM OF UPPER-OUTER QUADRANT OF RIGHT BREAST IN FEMALE, ESTROGEN RECEPTOR POSITIVE: Status: ACTIVE | Noted: 2020-10-22

## 2024-02-18 LAB
25(OH)D3 SERPL-MCNC: 46.2 NG/ML
ALBUMIN SERPL ELPH-MCNC: 4.3 G/DL
ALP BLD-CCNC: 101 U/L
ALT SERPL-CCNC: 9 U/L
ANION GAP SERPL CALC-SCNC: 11 MMOL/L
AST SERPL-CCNC: 13 U/L
BILIRUB SERPL-MCNC: 0.3 MG/DL
BUN SERPL-MCNC: 13 MG/DL
CALCIUM SERPL-MCNC: 9.2 MG/DL
CHLORIDE SERPL-SCNC: 106 MMOL/L
CO2 SERPL-SCNC: 24 MMOL/L
CREAT SERPL-MCNC: 0.76 MG/DL
EGFR: 94 ML/MIN/1.73M2
GLUCOSE SERPL-MCNC: 94 MG/DL
POTASSIUM SERPL-SCNC: 4.5 MMOL/L
PROT SERPL-MCNC: 6.7 G/DL
SODIUM SERPL-SCNC: 141 MMOL/L

## 2024-02-18 NOTE — ASSESSMENT
[FreeTextEntry1] : Ms. CRYSTAL 's questions were answered to her satisfaction. She  expressed her  understanding and willingness to comply with the above recommendations, and  will return to the office in 6 months.

## 2024-02-18 NOTE — REVIEW OF SYSTEMS
[Dysmenorrhea/Abn Vaginal Bleeding] : dysmenorrhea/abnormal vaginal bleeding [Skin Wound] : skin wound [Negative] : Psychiatric [Fever] : no fever [Night Sweats] : no night sweats [Fatigue] : no fatigue [de-identified] : right breast postoperative eschar removed; now with good local granulation tissue

## 2024-02-18 NOTE — HISTORY OF PRESENT ILLNESS
[Disease: _____________________] : Disease: [unfilled] [T: ___] : T[unfilled] [N: ___] : N[unfilled] [M: ___] : M[unfilled] [AJCC Stage: ____] : AJCC Stage: [unfilled] [de-identified] : 53F, premenopausal, , with past medical history of fibrocystic breasts, adenomatous nodular goiter, hypothyroidism, and with right breast moderately differentiated infiltrating ductal carcinoma, ER/[place]+, Her 2 negative diagnosed  in 2020.  CASE SYNOPSIS: 10/7/20- mammogram ([facility]): Bilateral scattered patchy areas of asymmetric breast tissue.  In the upper outer quadrant of the right breast anteriorly, distortion is noted.  Spot compression views and a true lateral view obtained with tomosynthesis.  Persistent asymmetric density and distortion is present.  Ultrasound performed for further evaluation 10/7/20- breast US: indeterminate asymmetry with distortion at 11:00 in the right breast anteriorly.  Ultrasound-guided core biopsy is recommended. 10/16/20 - US guided core biopsy R breast; pathology consistent with infiltrating ductal carcinoma, moderately differentiated, largest contiguous focus is at least 1.2 cm, no definitive LVI, no in situ component identified. 10/26/20 - breast MRI: 1.4 cm mass in the upper outer right breast, corresponding the newly diagnosed malignancy.  No MRI evidence of multicentric or contralateral disease. 2020-Oncotype DX recurrence score 16 (low). 2020- [facility] Diagnostic Testing Results: Heterozygous TSC 2 (c.1458 C > G(p.Tab352Box)) =VUS 1 (variant of uncertain significance), not actionable. 20- bilateral mastectomy with the EJ reconstruction; surgical pathology consistent with: Right breast infiltrating ductal carcinoma measuring 1.5 cm, no LVI, [place] score 6/9, evident ductal hyperplasia, nonatypical, sclerosing adenosis, apocrine metaplasia and cyst formation as well as microcalcification in benign tissue.  Margins negative, no LCIS or DCIS, 2 SLN + 3 LN not involved by tumor cells, stage I (pT1b, pN0), ER> 90%,[place]>90%, Her 2 negative. Left breast-ductal hyperplasia, nonatypical, sclerosing adenosis, cyst formation, and microcalcification.  Negative for malignancy.1SLN negative for malignancy. 21- starts Tamoxifen 2022- EMILY/BSO ( Dr. Malcolm) [de-identified] : infiltrating ductal carcinoma [de-identified] : ER> 90%,[place]>90%, Her 2 negative. [FreeTextEntry1] : Tamoxifen [de-identified] : Patient presents today for routine follow-up.  Endorses no new complaints today and is in excellent mood in anticipation of her only son's wedding in September 2024.  Denies fever, night sweats pain or shortness of breath.  Contemplating revision of bilateral EJ breast reconstruction, however not before September 2024.  In November 2023 patient had colonoscopy which confirmed diverticulosis. Good appetite, and patient reports involuntary weight loss (lost approximately 26 pounds in the past few month with dietary changes).   Ms. CRYSTAL reports no hospitalization and no changes in medication.  Laboratory results stable.

## 2024-02-18 NOTE — PHYSICAL EXAM
[Restricted in physically strenuous activity but ambulatory and able to carry out work of a light or sedentary nature] : Status 1- Restricted in physically strenuous activity but ambulatory and able to carry out work of a light or sedentary nature, e.g., light house work, office work [Obese] : obese [Normal] : normal appearance, no rash, nodules, vesicles, ulcers, erythema [de-identified] : b/l EJ reconstruction - scars well healed; no nipple reconstruction

## 2024-03-21 ENCOUNTER — APPOINTMENT (OUTPATIENT)
Dept: PLASTIC SURGERY | Facility: CLINIC | Age: 54
End: 2024-03-21
Payer: COMMERCIAL

## 2024-03-21 VITALS
TEMPERATURE: 98 F | HEART RATE: 82 BPM | OXYGEN SATURATION: 99 % | WEIGHT: 254 LBS | DIASTOLIC BLOOD PRESSURE: 83 MMHG | BODY MASS INDEX: 42.32 KG/M2 | SYSTOLIC BLOOD PRESSURE: 133 MMHG | HEIGHT: 65 IN

## 2024-03-21 PROCEDURE — 99215 OFFICE O/P EST HI 40 MIN: CPT

## 2024-03-21 NOTE — PHYSICAL EXAM
[de-identified] : bilateral breasts healed. Bilateral lateral fullness and ptosis. right breast with well healed scar in inferior/medial region. 5cm more vertical skin on the infra-areolar aspect of the left breast.  [de-identified] : bilateral standing cutaneous deformities with functional irritation.

## 2024-03-21 NOTE — HISTORY OF PRESENT ILLNESS
[FreeTextEntry1] : Isidra is a 50 y.o. F history of hypothyroidism and right breast invasive CA s/p bilateral mastectomies with EJ reconstruction on 11/30/2020. C/c/b right breast skin wounds, now fully healed. Pt presents today to discuss revision of bilateral breast reconstruction. She wishes for her breasts to be reduced in size.

## 2024-04-09 ENCOUNTER — OUTPATIENT (OUTPATIENT)
Dept: OUTPATIENT SERVICES | Facility: HOSPITAL | Age: 54
LOS: 1 days | End: 2024-04-09
Payer: COMMERCIAL

## 2024-04-09 VITALS
TEMPERATURE: 97 F | OXYGEN SATURATION: 100 % | HEART RATE: 63 BPM | RESPIRATION RATE: 18 BRPM | WEIGHT: 260.59 LBS | SYSTOLIC BLOOD PRESSURE: 123 MMHG | DIASTOLIC BLOOD PRESSURE: 81 MMHG | HEIGHT: 66 IN

## 2024-04-09 DIAGNOSIS — Z90.13 ACQUIRED ABSENCE OF BILATERAL BREASTS AND NIPPLES: ICD-10-CM

## 2024-04-09 DIAGNOSIS — Z90.710 ACQUIRED ABSENCE OF BOTH CERVIX AND UTERUS: Chronic | ICD-10-CM

## 2024-04-09 DIAGNOSIS — Z90.13 ACQUIRED ABSENCE OF BILATERAL BREASTS AND NIPPLES: Chronic | ICD-10-CM

## 2024-04-09 DIAGNOSIS — Z83.79 FAMILY HISTORY OF OTHER DISEASES OF THE DIGESTIVE SYSTEM: Chronic | ICD-10-CM

## 2024-04-09 DIAGNOSIS — E03.9 HYPOTHYROIDISM, UNSPECIFIED: ICD-10-CM

## 2024-04-09 DIAGNOSIS — Z01.818 ENCOUNTER FOR OTHER PREPROCEDURAL EXAMINATION: ICD-10-CM

## 2024-04-09 LAB
ANION GAP SERPL CALC-SCNC: 7 MMOL/L — SIGNIFICANT CHANGE UP (ref 5–17)
BUN SERPL-MCNC: 12 MG/DL — SIGNIFICANT CHANGE UP (ref 7–23)
CALCIUM SERPL-MCNC: 8.8 MG/DL — SIGNIFICANT CHANGE UP (ref 8.4–10.5)
CHLORIDE SERPL-SCNC: 109 MMOL/L — HIGH (ref 96–108)
CO2 SERPL-SCNC: 26 MMOL/L — SIGNIFICANT CHANGE UP (ref 22–31)
CREAT SERPL-MCNC: 0.91 MG/DL — SIGNIFICANT CHANGE UP (ref 0.5–1.3)
EGFR: 76 ML/MIN/1.73M2 — SIGNIFICANT CHANGE UP
GLUCOSE SERPL-MCNC: 102 MG/DL — HIGH (ref 70–99)
HCT VFR BLD CALC: 38.1 % — SIGNIFICANT CHANGE UP (ref 34.5–45)
HGB BLD-MCNC: 13.1 G/DL — SIGNIFICANT CHANGE UP (ref 11.5–15.5)
MCHC RBC-ENTMCNC: 29.1 PG — SIGNIFICANT CHANGE UP (ref 27–34)
MCHC RBC-ENTMCNC: 34.4 GM/DL — SIGNIFICANT CHANGE UP (ref 32–36)
MCV RBC AUTO: 84.7 FL — SIGNIFICANT CHANGE UP (ref 80–100)
NRBC # BLD: 0 /100 WBCS — SIGNIFICANT CHANGE UP (ref 0–0)
PLATELET # BLD AUTO: 244 K/UL — SIGNIFICANT CHANGE UP (ref 150–400)
POTASSIUM SERPL-MCNC: 4.1 MMOL/L — SIGNIFICANT CHANGE UP (ref 3.5–5.3)
POTASSIUM SERPL-SCNC: 4.1 MMOL/L — SIGNIFICANT CHANGE UP (ref 3.5–5.3)
RBC # BLD: 4.5 M/UL — SIGNIFICANT CHANGE UP (ref 3.8–5.2)
RBC # FLD: 11.9 % — SIGNIFICANT CHANGE UP (ref 10.3–14.5)
SODIUM SERPL-SCNC: 142 MMOL/L — SIGNIFICANT CHANGE UP (ref 135–145)
WBC # BLD: 6.42 K/UL — SIGNIFICANT CHANGE UP (ref 3.8–10.5)
WBC # FLD AUTO: 6.42 K/UL — SIGNIFICANT CHANGE UP (ref 3.8–10.5)

## 2024-04-09 PROCEDURE — 85027 COMPLETE CBC AUTOMATED: CPT

## 2024-04-09 PROCEDURE — 80048 BASIC METABOLIC PNL TOTAL CA: CPT

## 2024-04-09 PROCEDURE — 93005 ELECTROCARDIOGRAM TRACING: CPT

## 2024-04-09 PROCEDURE — 93010 ELECTROCARDIOGRAM REPORT: CPT | Mod: NC

## 2024-04-09 PROCEDURE — G0463: CPT

## 2024-04-09 PROCEDURE — 36415 COLL VENOUS BLD VENIPUNCTURE: CPT

## 2024-04-09 NOTE — H&P PST ADULT - NSICDXFAMILYHX_GEN_ALL_CORE_FT
FAMILY HISTORY:  Father  Still living? No  Family history of brain cancer, Age at diagnosis: Age Unknown    Mother  Still living? Yes, Estimated age: Age Unknown  Family history of breast cancer in mother, Age at diagnosis: Age Unknown    Sibling  Still living? Yes, Estimated age: Age Unknown  Family history of breast cancer in sister, Age at diagnosis: Age Unknown

## 2024-04-09 NOTE — H&P PST ADULT - NSICDXPASTSURGICALHX_GEN_ALL_CORE_FT
PAST SURGICAL HISTORY:  FH: cholecystectomy     H/O bilateral mastectomy     S/P total hysterectomy

## 2024-04-09 NOTE — H&P PST ADULT - HISTORY OF PRESENT ILLNESS
54y/o female with medical h/o Hypothyroid, reports pmhx Breast cancer, right (2020) and underwent Bilateral mastectomy w/alexey flap, denies chemotherapy/radiation, presents today for PST for scheduled Revision Bilateral Breast Reconstruction, Abdominal Donor Site Scar Revision on 4/23/2024

## 2024-04-09 NOTE — H&P PST ADULT - PROBLEM SELECTOR PLAN 1
Pre-op instructions given. Pt verbalized understanding  Chlorhexidine wash instructions given  Pt instructed to hold all NSAIDs + herbal supplements/vitamins 7 days before surgery  Pending: lab result Pre-op instructions given. Pt verbalized understanding  Chlorhexidine wash instructions given  Pt instructed to hold all NSAIDs + herbal supplements/vitamins 7 days before surgery  RAY precaution - stop bang 3  Pending: lab result

## 2024-04-09 NOTE — H&P PST ADULT - NSANTHOSAYNRD_GEN_A_CORE
neck 16inches/No. RAY screening performed.  STOP BANG Legend: 0-2 = LOW Risk; 3-4 = INTERMEDIATE Risk; 5-8 = HIGH Risk

## 2024-04-09 NOTE — H&P PST ADULT - NSICDXPASTMEDICALHX_GEN_ALL_CORE_FT
PAST MEDICAL HISTORY:  Hypothyroidism, unspecified type     Malignant neoplasm of female breast, unspecified estrogen receptor status, unspecified laterality, unspecified site of breast     Migraine without status migrainosus, not intractable, unspecified migraine type     Thyroid nodule

## 2024-04-09 NOTE — H&P PST ADULT - NS PRO AD NO ADVANCE DIRECTIVE
[facility] Hospital Medicine  History & Physical    Patient Name: Conchita Rouse  MRN: [id number]  Admission Date: 11/4/2020  Attending Physician: Hernesto Melivn MD  Primary Care Provider: aNncy Valles MD         Patient information was obtained from patient, past medical records and ER records.     Subjective:     Principal Problem:Pyogenic arthritis of right knee joint    Chief Complaint:   Chief Complaint   Patient presents with    Knee Pain     right knee septic. sent by MD        HPI: Conchita Rouse is a 66 y.o. female patient  with PMHx of dyslipidemia, HTN, asplastic anemia, prediabetes, TIA  for evaluation of R knee pain and swelling which onset gradually 2 weeks ago. Patient was seen by Estrelal Quinones PA-C yesterday in ortho clinic.  Patient was told to come to ED for pre-operative medical clearance. Last PO intake was coffee at 8:30 AM today. There is concerns for pyogenic joint of the right knee.  Empiric Vancomycin given in the ED. Uric acid is normal. COVID is negative. Pt denies any fever, chills, night sweats. Initially, she had pain and swelling to the left knee which is now improved. Pt now has pain and swelling to the right knee and unable to obtain arthrocentesis.   Vital signs Temp 98.3, pulse 82, resp 18, B/P 123/75  CXR -  No acute cardiopulmonary findings  Labs find WBC 6.05, H/H 10.7/33.5, platelets 57, Na 134, K+ 5.4. Pt is admitted for further evaluation and management of septic arthritis of the knee.       Past Medical History:   Diagnosis Date    Dyslipidemia (high LDL; low HDL)     10y CV event risk 14.7% (asuming no DM2), which could be reduced to 4.5% with RF optimization, for which I recommended atorvastatin 20 mg 1 po qd x2 weeks and then 40 mg 1 po qd    History of shingles 02/14/2018    Per note from Dr. Rogelio Norwood III on 2/14/18; left upper buttocks.   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 Rogelio Norwood III last visit 2/14/18 (tolerating CSA & promacta); also notes aplastic anemia (AA psot [facility] ATG - week 7/17/17) and ITP, for which ATG & promacta following decadron pulse for ITP & AA; plan for second opinion on BM bx; next flow in 3 mo; intent of rx: palliative    Prediabetes     TIA (transient ischemic attack) 2007    She was at "[facility]", where she noted she was feeling slow, took a nap, woke up with a numb/tingling left jaw to arm, which persistned 20 minutes, took 3 baby aspirins, went to ER @ [facility], where facial droop was noted, but completely resolved & head CT revealed old minor abnormality.    Vitamin D deficiency        Past Surgical History:   Procedure Laterality Date    COLONOSCOPY      left eye surgery  1955    Due to left eye lid drooping       Review of patient's allergies indicates:   Allergen Reactions    Bactrim [sulfamethoxazole-trimethoprim] Edema     Swollen lips    Codeine Other (See Comments)     Pt states codeine does not cause hives. Had tooth extraction and medication given caused her to be jittery, feel hot and have to lay down like she was weak. morhpine given on 5/16/2017 iv for pain. Pt martine well without any sign or sx of allergy or reaction.       No current facility-administered medications on file prior to encounter.      Current Outpatient Medications on File Prior to Encounter   Medication Sig    amLODIPine (NORVASC) 5 MG tablet Take 1 tablet (5 mg total) by mouth once daily.    eltrombopag (PROMACTA) 75 mg Tab Take 75 mg by mouth once daily.     loratadine (CLARITIN) 10 mg tablet Take 1 tablet (10 mg total) by mouth once daily.    traMADoL (ULTRAM) 50 mg tablet Take 1 tablet (50 mg total) by mouth 2 (two) times daily as needed.    atorvastatin (LIPITOR) 40 MG tablet Take 1 tablet (40 mg total) by mouth once daily.    azelastine (ASTELIN) 137 mcg (0.1 %) nasal spray 1 spray (137 mcg total) by Nasal route 2 (two) times daily.    " fluticasone (FLONASE) 50 mcg/actuation nasal spray USE 2 SPRAYS IN EACH NOSTRIL 1 TIME A DAY AS NEEDED    meloxicam (MOBIC) 15 MG tablet Take 1 tablet (15 mg total) by mouth daily as needed for Pain. Take with meal    valacyclovir (VALTREX) 500 MG tablet Take 500 mg by mouth.     Family History     Problem Relation (Age of Onset)    Breast cancer Sister    Cancer Sister, Brother    Diabetes Mother    Heart disease Mother, Father        Tobacco Use    Smoking status: Never Smoker    Smokeless tobacco: Never Used   Substance and Sexual Activity    Alcohol use: Yes     Alcohol/week: 0.0 - 1.0 standard drinks     Comment: rarely    Drug use: No    Sexual activity: Not Currently     Partners: Male     Birth control/protection: Post-menopausal     Review of Systems   Constitutional: Positive for activity change.   HENT: Negative.    Respiratory: Negative.    Cardiovascular: Positive for leg swelling.   Gastrointestinal: Negative.    Genitourinary: Negative.    Musculoskeletal: Positive for gait problem and joint swelling.   Skin: Negative.    Hematological: Negative.    Psychiatric/Behavioral: Negative.      Objective:     Vital Signs (Most Recent):  Temp: 98.3 °F (36.8 °C) (11/04/20 1140)  Pulse: 87 (11/04/20 1332)  Resp: 16 (11/04/20 1332)  BP: (!) 147/72 (11/04/20 1332)  SpO2: 99 % (11/04/20 1332) Vital Signs (24h Range):  Temp:  [98.3 °F (36.8 °C)] 98.3 °F (36.8 °C)  Pulse:  [82-87] 87  Resp:  [16-18] 16  SpO2:  [99 %] 99 %  BP: (123-147)/(72-83) 147/72     Weight: 84.8 kg (187 lb)  Body mass index is 30.18 kg/m².    Physical Exam  Constitutional:       Appearance: She is well-developed.   HENT:      Head: Normocephalic and atraumatic.      Nose: Nose normal.   Eyes:      General: No scleral icterus.     Conjunctiva/sclera: Conjunctivae normal.      Pupils: Pupils are equal, round, and reactive to light.   Neck:      Musculoskeletal: Normal range of motion and neck supple.   Cardiovascular:      Rate and  Rhythm: Normal rate and regular rhythm.      Heart sounds: Normal heart sounds. No murmur. No friction rub. No gallop.    Pulmonary:      Effort: Pulmonary effort is normal.      Breath sounds: Normal breath sounds.   Abdominal:      General: Bowel sounds are normal.      Palpations: Abdomen is soft.   Musculoskeletal:         General: No tenderness.      Right knee: She exhibits decreased range of motion and swelling. She exhibits no ecchymosis and no deformity.   Skin:     General: Skin is warm and dry.   Neurological:      Mental Status: She is alert and oriented to person, place, and time.   Psychiatric:         Behavior: Behavior normal.           CRANIAL NERVES     CN III, IV, VI   Pupils are equal, round, and reactive to light.       Significant Labs:   CBC:   Recent Labs   Lab 11/03/20  1442 11/04/20  1253   WBC 5.99 6.05   HGB 11.4* 10.7*   HCT 34.9* 33.5*   PLT 65* 57*     CMP:   Recent Labs   Lab 11/04/20  1253   *   K 5.4*      CO2 24      BUN 16   CREATININE 0.9   CALCIUM 9.2   PROT 8.4   ALBUMIN 3.3*   BILITOT 0.5   ALKPHOS 88   AST 30   ALT 14   ANIONGAP 8   EGFRNONAA >60     All pertinent labs within the past 24 hours have been reviewed.    Significant Imaging: I have reviewed all pertinent imaging results/findings within the past 24 hours.    Assessment/Plan:     * Pyogenic arthritis of right knee joint  Empiric ABX of Vancomycin given  Presently NPO for surgical I & D and wash out per Orthopedics afternoon of 11/4/2020    Cardiac Risk Assessment - Pt with hx of TIA approx 20 years ago. She denies any chest pain, palpitations, presyncopal symptoms and SOB. According to Revised Cardiac Risk Assessment (1999) - pt has a 0.9 % risk for major cardiac event surrounding this low risk orthopedic surgical procedure.  Cardiac event includes MI, pulmonary edema, V fib, cardiac arrest and complete heart block.         Hyperkalemia  IV hydration  Continue Telemetry  Repeat  chemistries      Dyslipidemia (high LDL; low HDL)  Continue STATIN      Thrombocytopenia  Platelet count of 57 K  Avoid anticoagulation  Possible need for platelet transfusion and FFP prior to surgery      Aplastic anemia  Thought to be 2/2 anaplastic anemia and pancytopenia; tx'd w/ Dr. Norwood in [place] hematology with Promacto 150 mg daily      Essential hypertension  Blood pressure controlled  Resume amoldipine      H/O TIA (transient ischemic attack) and stroke  Control blood pressure  Continue STATIN        VTE Risk Mitigation (From admission, onward)    None             Zara Larson NP  Department of Hospital Medicine   [facility]    No

## 2024-04-10 RX ORDER — ONDANSETRON 4 MG/1
4 TABLET, ORALLY DISINTEGRATING ORAL
Qty: 30 | Refills: 0 | Status: ACTIVE | COMMUNITY
Start: 2024-04-10 | End: 1900-01-01

## 2024-04-10 RX ORDER — CELECOXIB 200 MG/1
200 CAPSULE ORAL
Qty: 20 | Refills: 0 | Status: ACTIVE | COMMUNITY
Start: 2024-04-10 | End: 1900-01-01

## 2024-04-10 RX ORDER — CEFADROXIL 500 MG/1
500 CAPSULE ORAL
Qty: 4 | Refills: 0 | Status: ACTIVE | COMMUNITY
Start: 2024-04-10 | End: 1900-01-01

## 2024-04-10 RX ORDER — OXYCODONE 5 MG/1
5 TABLET ORAL EVERY 6 HOURS
Qty: 15 | Refills: 0 | Status: ACTIVE | COMMUNITY
Start: 2024-04-10 | End: 1900-01-01

## 2024-04-20 ENCOUNTER — RX RENEWAL (OUTPATIENT)
Age: 54
End: 2024-04-20

## 2024-04-22 ENCOUNTER — TRANSCRIPTION ENCOUNTER (OUTPATIENT)
Age: 54
End: 2024-04-22

## 2024-04-22 RX ORDER — TAMOXIFEN CITRATE 20 MG/1
1 TABLET, FILM COATED ORAL
Refills: 0 | DISCHARGE

## 2024-04-22 NOTE — ASU PATIENT PROFILE, ADULT - REASON FOR ADMISSION, PROFILE
Revision of EJ flap reconstruction and fix the dog ear's on my hips "Revision of EJ flap reconstruction and fix the dog ear's on my hips"

## 2024-04-22 NOTE — ASU PATIENT PROFILE, ADULT - PRO INTERPRETER NEED 2
Matthew JASSO from [facility] contacted Writer regarding current condition for patient  of difficulty breathing, increased weight gain and  Ascites. Patient then came on the phone and asked why Dr Ochoa did not follow up since CT of abdomen /pelvis exam on 8/17/23. Writer explained to patient the exam had not been finalized yet on 8/25/23 and Dr Ochoa was out of office on 8/28 and 8/29. Writer transferred call to Dr Ochoa's office to speak personally with her regarding the concerns.  
Spoke with patient and followed up on his concerns.  Home health nurse was also present during call.  Patient has been experiencing increased shortness of breath, abdominal distention and his pulse ox is hovering at 90%.  CT abdomen and pelvis reviewed which reported new moderate bilateral pleural effusion, ascites and extensive subcutaneous edema in the pattern of whole-body anasarca.  Based on CT findings and patient's symptoms, advised to go to the ER.  
English

## 2024-04-22 NOTE — ASU PATIENT PROFILE, ADULT - NSICDXPASTMEDICALHX_GEN_ALL_CORE_FT
PAST MEDICAL HISTORY:  Hypothyroidism, unspecified type     Malignant neoplasm of female breast, unspecified estrogen receptor status, unspecified laterality, unspecified site of breast right    Thyroid nodule      PAST MEDICAL HISTORY:  Hypothyroidism, unspecified type     Malignant neoplasm of female breast, unspecified estrogen receptor status, unspecified laterality, unspecified site of breast right    Thyroid nodule benign

## 2024-04-22 NOTE — ASU PATIENT PROFILE, ADULT - NSICDXPASTSURGICALHX_GEN_ALL_CORE_FT
PAST SURGICAL HISTORY:  FH: cholecystectomy     H/O bilateral mastectomy EJ flap 2020    S/P total hysterectomy

## 2024-04-22 NOTE — ASU PATIENT PROFILE, ADULT - FALL HARM RISK - UNIVERSAL INTERVENTIONS
Bed in lowest position, wheels locked, appropriate side rails in place/Call bell, personal items and telephone in reach/Instruct patient to call for assistance before getting out of bed or chair/Non-slip footwear when patient is out of bed/[place] to call system/Physically safe environment - no spills, clutter or unnecessary equipment/Purposeful Proactive Rounding/Room/bathroom lighting operational, light cord in reach

## 2024-04-23 ENCOUNTER — APPOINTMENT (OUTPATIENT)
Dept: PLASTIC SURGERY | Facility: HOSPITAL | Age: 54
End: 2024-04-23
Payer: COMMERCIAL

## 2024-04-23 ENCOUNTER — TRANSCRIPTION ENCOUNTER (OUTPATIENT)
Age: 54
End: 2024-04-23

## 2024-04-23 ENCOUNTER — OUTPATIENT (OUTPATIENT)
Dept: OUTPATIENT SERVICES | Facility: HOSPITAL | Age: 54
LOS: 1 days | End: 2024-04-23
Payer: COMMERCIAL

## 2024-04-23 VITALS
TEMPERATURE: 98 F | DIASTOLIC BLOOD PRESSURE: 79 MMHG | HEIGHT: 66 IN | WEIGHT: 260.59 LBS | RESPIRATION RATE: 14 BRPM | SYSTOLIC BLOOD PRESSURE: 126 MMHG | OXYGEN SATURATION: 99 % | HEART RATE: 72 BPM

## 2024-04-23 VITALS
RESPIRATION RATE: 16 BRPM | HEART RATE: 88 BPM | DIASTOLIC BLOOD PRESSURE: 75 MMHG | SYSTOLIC BLOOD PRESSURE: 114 MMHG | OXYGEN SATURATION: 96 % | TEMPERATURE: 98 F

## 2024-04-23 DIAGNOSIS — Z90.710 ACQUIRED ABSENCE OF BOTH CERVIX AND UTERUS: Chronic | ICD-10-CM

## 2024-04-23 DIAGNOSIS — Z90.13 ACQUIRED ABSENCE OF BILATERAL BREASTS AND NIPPLES: Chronic | ICD-10-CM

## 2024-04-23 DIAGNOSIS — Z90.13 ACQUIRED ABSENCE OF BILATERAL BREASTS AND NIPPLES: ICD-10-CM

## 2024-04-23 DIAGNOSIS — Z83.79 FAMILY HISTORY OF OTHER DISEASES OF THE DIGESTIVE SYSTEM: Chronic | ICD-10-CM

## 2024-04-23 PROCEDURE — 14301 TIS TRNFR ANY 30.1-60 SQ CM: CPT

## 2024-04-23 PROCEDURE — 19380 REVJ RECONSTRUCTED BREAST: CPT | Mod: 50

## 2024-04-23 PROCEDURE — 14302 TIS TRNFR ADDL 30 SQ CM: CPT

## 2024-04-23 PROCEDURE — C9399: CPT

## 2024-04-23 PROCEDURE — C1889: CPT

## 2024-04-23 DEVICE — CLIP APPLIER ETHICON LIGACLIP 11.5" MEDIUM: Type: IMPLANTABLE DEVICE | Site: BILATERAL | Status: FUNCTIONAL

## 2024-04-23 DEVICE — CLIP APPLIER ETHICON LIGACLIP 9 3/8" SMALL: Type: IMPLANTABLE DEVICE | Site: BILATERAL | Status: FUNCTIONAL

## 2024-04-23 RX ORDER — SODIUM CHLORIDE 9 MG/ML
1000 INJECTION, SOLUTION INTRAVENOUS
Refills: 0 | Status: DISCONTINUED | OUTPATIENT
Start: 2024-04-23 | End: 2024-04-23

## 2024-04-23 RX ORDER — HYDROMORPHONE HYDROCHLORIDE 2 MG/ML
0.5 INJECTION INTRAMUSCULAR; INTRAVENOUS; SUBCUTANEOUS
Refills: 0 | Status: DISCONTINUED | OUTPATIENT
Start: 2024-04-23 | End: 2024-04-23

## 2024-04-23 RX ORDER — HYDROMORPHONE HYDROCHLORIDE 2 MG/ML
1 INJECTION INTRAMUSCULAR; INTRAVENOUS; SUBCUTANEOUS
Refills: 0 | Status: DISCONTINUED | OUTPATIENT
Start: 2024-04-23 | End: 2024-04-23

## 2024-04-23 RX ORDER — LEVOTHYROXINE SODIUM 125 MCG
1 TABLET ORAL
Qty: 0 | Refills: 0 | DISCHARGE

## 2024-04-23 RX ORDER — IBUPROFEN 200 MG
0 TABLET ORAL
Refills: 0 | DISCHARGE

## 2024-04-23 RX ORDER — ONDANSETRON 8 MG/1
4 TABLET, FILM COATED ORAL ONCE
Refills: 0 | Status: DISCONTINUED | OUTPATIENT
Start: 2024-04-23 | End: 2024-04-23

## 2024-04-23 RX ADMIN — SODIUM CHLORIDE 50 MILLILITER(S): 9 INJECTION, SOLUTION INTRAVENOUS at 06:43

## 2024-04-23 NOTE — BRIEF OPERATIVE NOTE - NSICDXBRIEFPROCEDURE_GEN_ALL_CORE_FT
PROCEDURES:  Revision, reconstruction, breast, bilateral 23-Apr-2024 12:54:22  Sara Mack  Adjacent tissue transfer, defect size 30 sq cm or greater 23-Apr-2024 12:55:24  Sara Mack

## 2024-04-23 NOTE — ASU DISCHARGE PLAN (ADULT/PEDIATRIC) - ASU DC SPECIAL INSTRUCTIONSFT
Take the following medications as prescribed.    Duricef 500mg: take one tablet every 12 hours for two days. Take with food. First dose on evening of discharge.    Celebrex 200mg: take one tablet every 12 hours for pain control. Take with food.    Zofran 4mg: take one tablet every 6-8 hours as needed for nausea/vomiting.     Oxycodone 5mg: take one tablet every 6-8 hours as needed for severe breakthrough pain. No more than 4 tablets in 24 hours.     Your post-op appointment will be Monday at 10  [facility] in [place].

## 2024-04-23 NOTE — BRIEF OPERATIVE NOTE - OPERATION/FINDINGS
revision of bilateral breast reconstruction with bilateral reduction using wise pattern incision and liposuction, abdominal donor site scar revision

## 2024-04-23 NOTE — ASU DISCHARGE PLAN (ADULT/PEDIATRIC) - NS MD DC FALL RISK RISK
For information on Fall & Injury Prevention, visit: [email or web address] OR  [email or web address] OR  https://www.cdc.gov/steadi/patient.html

## 2024-04-24 PROBLEM — C50.919 MALIGNANT NEOPLASM OF UNSPECIFIED SITE OF UNSPECIFIED FEMALE BREAST: Chronic | Status: ACTIVE | Noted: 2020-11-24

## 2024-04-29 ENCOUNTER — APPOINTMENT (OUTPATIENT)
Dept: PLASTIC SURGERY | Facility: CLINIC | Age: 54
End: 2024-04-29
Payer: COMMERCIAL

## 2024-04-29 VITALS
BODY MASS INDEX: 42.32 KG/M2 | WEIGHT: 254 LBS | SYSTOLIC BLOOD PRESSURE: 123 MMHG | HEIGHT: 65 IN | OXYGEN SATURATION: 97 % | HEART RATE: 92 BPM | DIASTOLIC BLOOD PRESSURE: 80 MMHG | TEMPERATURE: 98 F

## 2024-04-29 DIAGNOSIS — Z90.13 ACQUIRED ABSENCE OF BILATERAL BREASTS AND NIPPLES: ICD-10-CM

## 2024-04-29 PROCEDURE — 99024 POSTOP FOLLOW-UP VISIT: CPT

## 2024-04-29 NOTE — PHYSICAL EXAM
[de-identified] : soft, NT. Incisions C/D/I with dermabond, healing well. Mild superficial ecchymoses bilaterally. Good projection and symmetry. No evidence of infection, collection, or hematoma. B/l breast drains with minimal SS output. Drains removed and bacitracin/gauze dressing placed on drain site wounds.  [de-identified] : B/l incisions C/D/I with dermabond. Improved contour. No evidence of infection, collection, or hematoma.  [de-identified] : FROM b/l UEs. Reach for recovery reviewed.

## 2024-04-29 NOTE — HISTORY OF PRESENT ILLNESS
[FreeTextEntry1] : 50 y.o. F history of hypothyroidism and right breast invasive CA s/p bilateral mastectomies with EJ reconstruction on 11/30/2020. C/c/b right breast skin wounds, now fully healed. Most recently, she is s/p revision of bilateral breast reconstruction with reduction and revision of abdominal donor site scar on 4/23/24. She presents today for initial post-op check. She reports feeling well with minimal pain. Denies fever, chills, chest pain, SOB, calf pain.

## 2024-05-13 PROBLEM — E04.1 NONTOXIC SINGLE THYROID NODULE: Chronic | Status: ACTIVE | Noted: 2020-11-24

## 2024-05-15 ENCOUNTER — NON-APPOINTMENT (OUTPATIENT)
Age: 54
End: 2024-05-15

## 2024-05-31 ENCOUNTER — APPOINTMENT (OUTPATIENT)
Dept: PLASTIC SURGERY | Facility: CLINIC | Age: 54
End: 2024-05-31
Payer: COMMERCIAL

## 2024-05-31 VITALS
HEIGHT: 65 IN | OXYGEN SATURATION: 99 % | HEART RATE: 87 BPM | TEMPERATURE: 98.1 F | WEIGHT: 254 LBS | BODY MASS INDEX: 42.32 KG/M2 | SYSTOLIC BLOOD PRESSURE: 114 MMHG | DIASTOLIC BLOOD PRESSURE: 80 MMHG

## 2024-05-31 DIAGNOSIS — Z90.13 ACQUIRED ABSENCE OF BILATERAL BREASTS AND NIPPLES: ICD-10-CM

## 2024-05-31 PROCEDURE — 99024 POSTOP FOLLOW-UP VISIT: CPT

## 2024-05-31 NOTE — HISTORY OF PRESENT ILLNESS
[FreeTextEntry1] : 50 y.o. F history of hypothyroidism and right breast invasive CA s/p bilateral mastectomies with EJ reconstruction on 11/30/2020. C/c/b right breast skin wounds, now fully healed. Most recently, she is s/p revision of bilateral breast reconstruction with reduction and revision of abdominal donor site scar on 4/23/24. She presents today for routine post-op check.

## 2024-05-31 NOTE — PHYSICAL EXAM
[de-identified] : healing well. Small SCDs bilaterally at the lateral scar ends. But good symmetry and shape. [de-identified] : healing well.

## 2024-07-15 ENCOUNTER — APPOINTMENT (OUTPATIENT)
Dept: PLASTIC SURGERY | Facility: CLINIC | Age: 54
End: 2024-07-15

## 2024-09-24 ENCOUNTER — OUTPATIENT (OUTPATIENT)
Dept: OUTPATIENT SERVICES | Facility: HOSPITAL | Age: 54
LOS: 1 days | Discharge: HOME | End: 2024-09-24

## 2024-09-24 DIAGNOSIS — Z90.710 ACQUIRED ABSENCE OF BOTH CERVIX AND UTERUS: Chronic | ICD-10-CM

## 2024-09-24 DIAGNOSIS — Z90.13 ACQUIRED ABSENCE OF BILATERAL BREASTS AND NIPPLES: Chronic | ICD-10-CM

## 2024-09-24 DIAGNOSIS — Z83.79 FAMILY HISTORY OF OTHER DISEASES OF THE DIGESTIVE SYSTEM: Chronic | ICD-10-CM

## 2024-09-24 DIAGNOSIS — C50.919 MALIGNANT NEOPLASM OF UNSPECIFIED SITE OF UNSPECIFIED FEMALE BREAST: ICD-10-CM

## 2024-10-04 ENCOUNTER — APPOINTMENT (OUTPATIENT)
Dept: HEMATOLOGY ONCOLOGY | Facility: CLINIC | Age: 54
End: 2024-10-04

## (undated) DEVICE — SUT PDS II 3-0 27" SH

## (undated) DEVICE — BASIN SET DOUBLE

## (undated) DEVICE — POSITIONER STRAP ARMBOARD VELCRO TS-30

## (undated) DEVICE — DRSG MAMMARY SUPPORT MED SIZE 2

## (undated) DEVICE — ELCTR BOVIE TIP BLADE INSULATED 2.75" EDGE

## (undated) DEVICE — LABELS BLANK W PEN

## (undated) DEVICE — DRSG STERISTRIPS 0.5 X 4"

## (undated) DEVICE — DRAPE 3/4 SHEET 52X76"

## (undated) DEVICE — STAPLER SKIN MULTI DIRECTION W35

## (undated) DEVICE — DRAPE IOBAN 23" X 23"

## (undated) DEVICE — SOL IRR POUR NS 0.9% 500ML

## (undated) DEVICE — ELCTR STRYKER NEPTUNE SMOKE EVACUATION PENCIL (GREEN)

## (undated) DEVICE — MARKING PEN W RULER

## (undated) DEVICE — GLV 7.5 PROTEXIS (WHITE)

## (undated) DEVICE — PACK MINOR

## (undated) DEVICE — VENODYNE/SCD SLEEVE CALF MEDIUM

## (undated) DEVICE — GOWN LG

## (undated) DEVICE — ELCTR BOVIE TIP BLADE INSULATED 6.5" EDGE

## (undated) DEVICE — DRSG MAMMARY SUPPORT LG SIZE 4

## (undated) DEVICE — SUT SOFSILK 2-0 18" C-15

## (undated) DEVICE — DRSG TAPE MICROFOAM 3"

## (undated) DEVICE — WARMING BLANKET FULL ADULT

## (undated) DEVICE — DRSG MAMMARY SUPPORT SM SIZE 1

## (undated) DEVICE — SOL IRR POUR H2O 500ML

## (undated) DEVICE — Device

## (undated) DEVICE — GLV 8 PROTEXIS (WHITE)

## (undated) DEVICE — SUT MONOCRYL 4-0 27" PS-2 UNDYED

## (undated) DEVICE — DRSG COMBINE 5X9"

## (undated) DEVICE — DRAPE CHEST BREAST 106" X 122"

## (undated) DEVICE — DRSG MAMMARY SUPPORT MED SIZE 3

## (undated) DEVICE — SUT MONOCRYL 3-0 27" PS-2 UNDYED

## (undated) DEVICE — DRSG MAMMARY SUPPORT XL SIZE 5